# Patient Record
Sex: MALE | Race: WHITE | Employment: FULL TIME | URBAN - METROPOLITAN AREA
[De-identification: names, ages, dates, MRNs, and addresses within clinical notes are randomized per-mention and may not be internally consistent; named-entity substitution may affect disease eponyms.]

---

## 2017-01-12 ENCOUNTER — APPOINTMENT (OUTPATIENT)
Dept: INFUSION THERAPY | Age: 57
End: 2017-01-12

## 2017-01-12 RX ORDER — HYDROCHLOROTHIAZIDE 25 MG/1
25 TABLET ORAL DAILY
Qty: 90 TAB | Refills: 0 | Status: CANCELLED | OUTPATIENT
Start: 2017-01-12

## 2017-02-10 RX ORDER — DIPHENHYDRAMINE HCL 25 MG
CAPSULE ORAL
Qty: 2 CAP | Refills: 0 | Status: SHIPPED | OUTPATIENT
Start: 2017-02-10 | End: 2017-06-07 | Stop reason: ALTCHOICE

## 2017-02-10 RX ORDER — PREDNISONE 10 MG/1
TABLET ORAL
Qty: 3 TAB | Refills: 0 | Status: SHIPPED | OUTPATIENT
Start: 2017-02-10 | End: 2017-06-07 | Stop reason: ALTCHOICE

## 2017-02-15 DIAGNOSIS — I10 UNCONTROLLED HYPERTENSION: ICD-10-CM

## 2017-02-15 RX ORDER — LISINOPRIL AND HYDROCHLOROTHIAZIDE 12.5; 2 MG/1; MG/1
1 TABLET ORAL DAILY
Qty: 30 TAB | Refills: 2 | Status: SHIPPED | OUTPATIENT
Start: 2017-02-15 | End: 2017-05-27 | Stop reason: SDUPTHER

## 2017-02-21 ENCOUNTER — HOSPITAL ENCOUNTER (OUTPATIENT)
Dept: CT IMAGING | Age: 57
Discharge: HOME | End: 2017-02-21
Payer: COMMERCIAL

## 2017-02-21 DIAGNOSIS — C82.80: ICD-10-CM

## 2017-02-21 PROCEDURE — 74011000258 HC RX REV CODE- 258

## 2017-02-21 PROCEDURE — 74177 CT ABD & PELVIS W/CONTRAST: CPT

## 2017-02-21 PROCEDURE — 74011636320 HC RX REV CODE- 636/320

## 2017-02-21 PROCEDURE — 71260 CT THORAX DX C+: CPT

## 2017-02-21 RX ORDER — SODIUM CHLORIDE 0.9 % (FLUSH) 0.9 %
10 SYRINGE (ML) INJECTION
Status: COMPLETED | OUTPATIENT
Start: 2017-02-21 | End: 2017-02-21

## 2017-02-21 RX ADMIN — IOPAMIDOL 100 ML: 755 INJECTION, SOLUTION INTRAVENOUS at 10:00

## 2017-02-21 RX ADMIN — SODIUM CHLORIDE 100 ML: 900 INJECTION, SOLUTION INTRAVENOUS at 10:42

## 2017-02-21 RX ADMIN — Medication 10 ML: at 10:42

## 2017-02-24 ENCOUNTER — OFFICE VISIT (OUTPATIENT)
Dept: ONCOLOGY | Age: 57
End: 2017-02-24

## 2017-02-24 VITALS
WEIGHT: 230.8 LBS | BODY MASS INDEX: 29.62 KG/M2 | HEART RATE: 96 BPM | RESPIRATION RATE: 16 BRPM | OXYGEN SATURATION: 97 % | DIASTOLIC BLOOD PRESSURE: 93 MMHG | HEIGHT: 74 IN | TEMPERATURE: 97 F | SYSTOLIC BLOOD PRESSURE: 139 MMHG

## 2017-02-24 DIAGNOSIS — C82.80: Primary | ICD-10-CM

## 2017-02-24 DIAGNOSIS — C82.80: ICD-10-CM

## 2017-02-24 NOTE — PROGRESS NOTES
Chief Complaint   Patient presents with    Lymphoma    Other     CT Scan results     1. Have you been to the ER, urgent care clinic since your last visit? Hospitalized since your last visit? no    2. Have you seen or consulted any other health care providers outside of the [address] [place] [address] since your last visit? Include any pap smears or colon screening.  no

## 2017-02-24 NOTE — PROGRESS NOTES
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 Diagnosed as Stage III back in 1999. TREATMENT  R-CHOP x 6 cycles in 1999. CR1  Recurrence in 2008 treated with Rituxan. From January 2009 through February 2011 in [address] [place] [address] Drive  November 2012 evidence of recurrence in the spine on CT, MRI, and PET. Rituxan/Bendamustine started in April 2013 for 4 cycles. High dose methotrexate beginning in 7/15/2013 x 2 cycles, completed in August 2013. Autologous transplant on 10/22/13 with [facility]  Maintenance Rituxan started in July 2014 stopped July 2016     HISTORY OF PRESENT ILLNESS: Mr. Tu Garnett is a 49 y.o. male who presents for follow-up of lymphoma as above. He has had multiple chemotherapy regimens including an autologous stem cell transplant in October of 2013. He started his care with Dr. Debora Loera and has been following with Dr. Margarita Lugo since around 2008. Follows with Dr. Evelyn Mckeon at [facility] as well as Dr. Girish Angelo at [facility]. Presents today for follow-up. Overall, has been doing well since I saw him last.      He was seen by Dr. Girish Angelo at [facility] after his last Rituxan infusion in summer 2016. He was noted to have neutropenia. This improved with 1 shot of neupogen. Counts have been stable since. Otherwise, no new major medical problems. No fevers, chills or weight loss. No new lumps or bumps. No CP/SOB/TORRES. No nausea, vomiting, diarrhea, or constipation. No bleeding. Otherwise, complete ROS is per the symptom report form which has been scanned into the media section of the electronic medical record.       Past Medical History:   Diagnosis Date    Cancer [facility])     NHL    Chemotherapy follow-up examination     NIKO 9-25-15= Exercise stress echo is normal. Walked 9 minutes, normal EF    GERD (gastroesophageal reflux disease)     Gout 10/24/2011    Hypertension     Lymphoma, small cleaved-cell, follicular ([facility] [address]     recurrent,currently on remission       Past Surgical History:   Procedure Laterality Date    HX HEENT      lymph node resection-midline,neck    HX TRANSPLANT  10/2013    bone marrow transplant       Allergies   Allergen Reactions    Lactose Nausea and Vomiting    Iodinated Contrast Media - Oral And Iv Dye Hives       Current Outpatient Prescriptions   Medication Sig Dispense Refill    lisinopril-hydroCHLOROthiazide (PRINZIDE, ZESTORETIC) 20-12.5 mg per tablet Take 1 Tab by mouth daily for 30 days. 30 Tab 2    acetaminophen (TYLENOL) 325 mg tablet Take 650 mg by mouth every four (4) hours as needed for Pain (yesterday). Takes occasionally for joint pain      indomethacin (INDOCIN) 50 mg capsule Take 1 Cap by mouth two (2) times a day. 20 Cap 0    aspirin delayed-release 81 mg tablet Take  by mouth daily.  predniSONE (DELTASONE) 10 mg tablet Take 5 tabs (50mg) by mouth 13 hours, 7 hours, then 1 hour prior to CT 3 Tab 0    diphenhydrAMINE (BENADRYL) 25 mg capsule Take 2 tabs by mouth 1 hour prior to CT 2 Cap 0       Social History     Social History    Marital status:      Spouse name: N/A    Number of children: N/A    Years of education: N/A     Social History Main Topics    Smoking status: Former Smoker     Packs/day: 1.00     Years: 7.00     Quit date: 9/4/1999    Smokeless tobacco: Never Used    Alcohol use 3.5 oz/week     7 Standard drinks or equivalent per week    Drug use: No    Sexual activity: Yes     Partners: Female     Other Topics Concern    None     Social History Narrative    Works in [employer] at [employer]. Family History   Problem Relation Age of Onset    Lung Disease Mother      COPD    Heart Disease Father      polymyalgia rheumatica    Hypertension Brother     Cancer Brother      testicular cancer       ROS  As per the HPI, otherwise a comprehensive ROS is negative.   ECOG PS is 0.  Emotional well being addressed and patient is coping well. Physical Examination:   Visit Vitals    BP (!) 139/93 (BP 1 Location: Right arm, BP Patient Position: Sitting)    Pulse 96    Temp 97 °F (36.1 °C)    Resp 16    Ht 6' 2\" (1.88 m)    Wt 230 lb 12.8 oz (104.7 kg)    SpO2 97%    BMI 29.63 kg/m2     General appearance - alert, well appearing, and in no distress  Mental status - oriented to person, place, and time  Mouth - mucous membranes moist, pharynx normal without lesions  Neck - supple, no significant adenopathy  Lymphatics - no palpable lymphadenopathy, no hepatosplenomegaly  Chest - clear to auscultation, no wheezes, rales or rhonchi, symmetric air entry  Heart - normal rate, regular rhythm, normal S1, S2, no murmurs, rubs, clicks or gallops  Abdomen - soft, nontender, nondistended, no masses or organomegaly, bowel sounds present  Back exam - full range of motion, no tenderness, palpable spasm or pain on motion  Neurological - normal speech, no focal findings or movement disorder noted  Musculoskeletal - no joint tenderness, deformity or swelling  Extremities - peripheral pulses normal, no pedal edema, no clubbing or cyanosis  Skin - normal coloration and turgor, no rashes, no suspicious skin lesions noted    LABS  Lab Results   Component Value Date/Time    WBC 4.5 12/07/2016 08:22 AM    HGB 14.2 12/07/2016 08:22 AM    HCT 41.7 12/07/2016 08:22 AM    PLATELET 941 78/94/2068 08:22 AM    MCV 98 12/07/2016 08:22 AM    ABS.  NEUTROPHILS 1.7 09/22/2016 02:19 PM     Lab Results   Component Value Date/Time    Sodium 140 12/07/2016 08:22 AM    Potassium 3.6 12/07/2016 08:22 AM    Chloride 97 12/07/2016 08:22 AM    CO2 27 12/07/2016 08:22 AM    Glucose 105 12/07/2016 08:22 AM    BUN 19 12/07/2016 08:22 AM    Creatinine 1.27 12/07/2016 08:22 AM    GFR est AA 74 12/07/2016 08:22 AM    GFR est non-AA 64 12/07/2016 08:22 AM    Calcium 9.5 12/07/2016 08:22 AM     Lab Results   Component Value Date/Time AST (SGOT) 22 12/07/2016 08:22 AM    Alk. phosphatase 81 12/07/2016 08:22 AM    Protein, total 6.3 12/07/2016 08:22 AM    Albumin 4.4 12/07/2016 08:22 AM    Globulin 2.7 09/22/2016 02:19 PM    A-G Ratio 2.3 12/07/2016 08:22 AM       PATHOLOGY  I don't have his original pathology report. IMAGING  CT scan of his chest abdomen and pelvis on 8/19/2016 with an 11 mm soft tissue nodule just anterior to the left adrenal gland. It was previously 8 mm in size. Otherwise, the examination was unremarkable. CT chest/abdomen/pelvis on 2/22/16 with stable disease. CT chest/abdomen/pelvis on 8/18/15 with no lymphadenopathy in the chest, abdomen, or pelvis. Severe coronary calcifications. Previously described groundglass opacities in the superior segment left lower lobe most likely related to respiratory bronchiolitis. These have resolved. CT neck,chest, abdomen, pelvis on 3/25/15 with no lymphadenopathy in the neck, chest, abdomen, or pelvis. Subtle groundglass nodularity superior segment left lower lobe may be infectious/inflammatory. DEXA scan on 10/29/14 with normal bone density. CT neck, chest, abdomen, pelvis on 10/15/14 with no evidence of active disease. ASSESSMENT  Mr. Chris Temple is a 49 y.o. male who presents for follow-up of follicular lymphoma. DISCUSSION/PLAN  1. Follicular lymphoma. He completed Rituxan maintenance in July 2016. His CT scans from 2/21/17 were reviewed in detail. There is a small soft tissue nodule versus lymph node near the left adrenal gland this has not changed. It is tough to know the significance of this. For now, let us continue to check CT scans every 6 months. He does well with premedication for contrast allergy. 2.  Neutropenia. He did have some neutropenia which was presumably due to Rituxan. This has resolved. He had blood work at [facility] 2 days ago. I've requested these results. Plan to see him back in 6 months, but sooner if needed.     Joss Fermin, MD Mr. Nunezon Harley has a reminder for a \"due or due soon\" health maintenance. I have asked that he contact his primary care provider for follow-up on this health maintenance.

## 2017-03-25 DIAGNOSIS — M10.9 GOUT, UNSPECIFIED CAUSE, UNSPECIFIED CHRONICITY, UNSPECIFIED SITE: ICD-10-CM

## 2017-03-27 RX ORDER — INDOMETHACIN 50 MG/1
50 CAPSULE ORAL 2 TIMES DAILY
Qty: 20 CAP | Refills: 0 | Status: SHIPPED | OUTPATIENT
Start: 2017-03-27 | End: 2017-05-17 | Stop reason: SDUPTHER

## 2017-03-27 NOTE — TELEPHONE ENCOUNTER
From: Sujata Rossi  To: Bing Damian MD  Sent: 3/25/2017 4:59 PM EDT  Subject: Medication Renewal Request    Original authorizing provider: MD Sujata Marie would like a refill of the following medications:  indomethacin (INDOCIN) 50 mg capsule Bing Damian MD]    Preferred pharmacy: [address] AT [address]    Comment:  Please refill at [place] on [address], as we no longer have [facility]. Thank you!

## 2017-05-17 DIAGNOSIS — M10.9 GOUT, UNSPECIFIED CAUSE, UNSPECIFIED CHRONICITY, UNSPECIFIED SITE: ICD-10-CM

## 2017-05-17 NOTE — TELEPHONE ENCOUNTER
From: Bre Herrera  To: Laureen Sanchez MD  Sent: 5/17/2017 2:55 PM EDT  Subject: Medication Renewal Request    Original authorizing provider: MD Bre Cochran would like a refill of the following medications:  indomethacin (INDOCIN) 50 mg capsule Laureen Sanchez MD]    Preferred pharmacy: [address] AT [address]    Comment:  Please refill at [facility] on [address]

## 2017-05-19 RX ORDER — INDOMETHACIN 50 MG/1
50 CAPSULE ORAL 2 TIMES DAILY
Qty: 20 CAP | Refills: 0 | Status: SHIPPED | OUTPATIENT
Start: 2017-05-19 | End: 2017-06-07 | Stop reason: SDUPTHER

## 2017-05-27 DIAGNOSIS — I10 UNCONTROLLED HYPERTENSION: ICD-10-CM

## 2017-05-27 RX ORDER — LISINOPRIL AND HYDROCHLOROTHIAZIDE 12.5; 2 MG/1; MG/1
TABLET ORAL
Qty: 30 TAB | Refills: 0 | Status: SHIPPED | OUTPATIENT
Start: 2017-05-27 | End: 2017-06-07 | Stop reason: SDUPTHER

## 2017-06-07 ENCOUNTER — OFFICE VISIT (OUTPATIENT)
Dept: INTERNAL MEDICINE CLINIC | Age: 57
End: 2017-06-07

## 2017-06-07 VITALS
DIASTOLIC BLOOD PRESSURE: 84 MMHG | OXYGEN SATURATION: 98 % | RESPIRATION RATE: 17 BRPM | TEMPERATURE: 97.8 F | BODY MASS INDEX: 28.85 KG/M2 | SYSTOLIC BLOOD PRESSURE: 120 MMHG | HEART RATE: 100 BPM | WEIGHT: 224.8 LBS | HEIGHT: 74 IN

## 2017-06-07 DIAGNOSIS — E53.8 B12 DEFICIENCY: ICD-10-CM

## 2017-06-07 DIAGNOSIS — C82.80: ICD-10-CM

## 2017-06-07 DIAGNOSIS — Z00.00 PHYSICAL EXAM: Primary | ICD-10-CM

## 2017-06-07 DIAGNOSIS — R68.82 DECREASED LIBIDO: ICD-10-CM

## 2017-06-07 DIAGNOSIS — I10 UNCONTROLLED HYPERTENSION: ICD-10-CM

## 2017-06-07 DIAGNOSIS — M10.9 GOUT, UNSPECIFIED CAUSE, UNSPECIFIED CHRONICITY, UNSPECIFIED SITE: ICD-10-CM

## 2017-06-07 RX ORDER — LISINOPRIL AND HYDROCHLOROTHIAZIDE 12.5; 2 MG/1; MG/1
1 TABLET ORAL DAILY
Qty: 90 TAB | Refills: 0 | Status: SHIPPED | OUTPATIENT
Start: 2017-06-07 | End: 2017-08-08 | Stop reason: SDUPTHER

## 2017-06-07 RX ORDER — INDOMETHACIN 50 MG/1
50 CAPSULE ORAL 2 TIMES DAILY
Qty: 180 CAP | Refills: 0 | Status: SHIPPED | OUTPATIENT
Start: 2017-06-07 | End: 2017-09-05

## 2017-06-07 NOTE — PROGRESS NOTES
Written by Narciso Mcgrath, as dictated by Dr. Willis Singer MD.    Eugenio Mittal is a 56 y.o. male. HPI  The patient comes in today for a complete physical examination. He recently got poison ivy, which he has been scratching and is treating with OTC cream. He experienced erectile dysfunction this year. He stopped taking B12 because he was concerned it was affecting his libido. He has lost 6 lbs since his last visit. He started getting vaccinations 2 weeks ago from [facility]  and will return for further immunizations. He follows with Dr. Zenaida Dennis (onc) and Dr. Viki Flanagan (onc) due to his Hx of lymphoma. He has been snoring at night, but has not gone in for a sleep study. He has allergies and been taking flonase for it. He declines chest tightness, palpitations, constipation and diarrhea. He drinks a cocktail every day and does not smoke. He walks daily. Patient Active Problem List   Diagnosis Code    HTN (hypertension) I10    Gout M10.9    Lymphoma, follicular, small and large cleaved-cell ([facility] [address] C82.80    Chemotherapy follow-up examination Z09        Current Outpatient Prescriptions on File Prior to Visit   Medication Sig Dispense Refill    aspirin delayed-release 81 mg tablet Take  by mouth daily.  diphenhydrAMINE (BENADRYL) 25 mg capsule Take 2 tabs by mouth 1 hour prior to CT 2 Cap 0    acetaminophen (TYLENOL) 325 mg tablet Take 650 mg by mouth every four (4) hours as needed for Pain (yesterday). Takes occasionally for joint pain       No current facility-administered medications on file prior to visit.         Allergies   Allergen Reactions    Lactose Nausea and Vomiting    Iodinated Contrast Media - Oral And Iv Dye Hives       Past Medical History:   Diagnosis Date    [facility]     NHL    Chemotherapy follow-up examination     NIKO 9-25-15= Exercise stress echo is normal. Walked 9 minutes, normal EF    GERD (gastroesophageal reflux disease)     Gout 10/24/2011    Hypertension     Lymphoma, small cleaved-cell, follicular (HCC)     recurrent,currently on remission       Past Surgical History:   Procedure Laterality Date    HX HEENT      lymph node resection-midline,neck    HX TRANSPLANT  10/2013    bone marrow transplant       Family History   Problem Relation Age of Onset    Lung Disease Mother      COPD    Heart Disease Father      polymyalgia rheumatica    Hypertension Brother     Cancer Brother      testicular cancer       Social History     Social History    Marital status:      Spouse name: N/A    Number of children: N/A    Years of education: N/A     Occupational History    Not on file. Social History Main Topics    Smoking status: Former Smoker     Packs/day: 1.00     Years: 7.00     Quit date: 9/4/1999    Smokeless tobacco: Never Used    Alcohol use 3.5 oz/week     7 Standard drinks or equivalent per week    Drug use: No    Sexual activity: Yes     Partners: Female     Other Topics Concern    Not on file     Social History Narrative    Works in [employer] at [employer]. No visits with results within 3 Month(s) from this visit.   Latest known visit with results is:    Office Visit on 12/06/2016   Component Date Value Ref Range Status    Glucose 12/07/2016 105* 65 - 99 mg/dL Final    BUN 12/07/2016 19  6 - 24 mg/dL Final    Creatinine 12/07/2016 1.27  0.76 - 1.27 mg/dL Final    GFR est non-AA 12/07/2016 64  >59 mL/min/1.73 Final    GFR est AA 12/07/2016 74  >59 mL/min/1.73 Final    BUN/Creatinine ratio 12/07/2016 15  9 - 20 Final    Sodium 12/07/2016 140  136 - 144 mmol/L Final    Comment: **Effective December 12, 2016 the reference interval**    for Sodium, Serum will be changing to:                                              134 - 144      Potassium 12/07/2016 3.6  3.5 - 5.2 mmol/L Final    Chloride 12/07/2016 97  97 - 106 mmol/L Final    Comment: **Effective December 12, 2016 the reference interval**    for Chloride, Serum will be changing to:                                               96 - 106      CO2 12/07/2016 27  18 - 29 mmol/L Final    Calcium 12/07/2016 9.5  8.7 - 10.2 mg/dL Final    Protein, total 12/07/2016 6.3  6.0 - 8.5 g/dL Final    Albumin 12/07/2016 4.4  3.5 - 5.5 g/dL Final    GLOBULIN, TOTAL 12/07/2016 1.9  1.5 - 4.5 g/dL Final    A-G Ratio 12/07/2016 2.3  1.1 - 2.5 Final    Bilirubin, total 12/07/2016 0.5  0.0 - 1.2 mg/dL Final    Alk. phosphatase 12/07/2016 81  39 - 117 IU/L Final    AST (SGOT) 12/07/2016 22  0 - 40 IU/L Final    ALT (SGPT) 12/07/2016 19  0 - 44 IU/L Final    WBC 12/07/2016 4.5  3.4 - 10.8 x10E3/uL Final    RBC 12/07/2016 4.24  4.14 - 5.80 x10E6/uL Final    HGB 12/07/2016 14.2  12.6 - 17.7 g/dL Final    HCT 12/07/2016 41.7  37.5 - 51.0 % Final    MCV 12/07/2016 98* 79 - 97 fL Final    MCH 12/07/2016 33.5* 26.6 - 33.0 pg Final    MCHC 12/07/2016 34.1  31.5 - 35.7 g/dL Final    RDW 12/07/2016 13.4  12.3 - 15.4 % Final    PLATELET 22/85/4344 480  150 - 379 x10E3/uL Final    Cholesterol, total 12/07/2016 194  100 - 199 mg/dL Final    Triglyceride 12/07/2016 140  0 - 149 mg/dL Final    HDL Cholesterol 12/07/2016 56  >39 mg/dL Final    VLDL, calculated 12/07/2016 28  5 - 40 mg/dL Final    LDL, calculated 12/07/2016 110* 0 - 99 mg/dL Final    TSH 12/07/2016 1.850  0.450 - 4.500 uIU/mL Final    VITAMIN D, 25-HYDROXY 12/07/2016 30.4  30.0 - 100.0 ng/mL Final    Comment: Vitamin D deficiency has been defined by the [place] of  Medicine and an Endocrine Society practice guideline as a  level of serum 25-OH vitamin D less than 20 ng/mL (1,2). The Endocrine Society went on to further define vitamin D  insufficiency as a level between 21 and 29 ng/mL (2). 1. IOM ([place] of Medicine). 2010. Dietary reference     intakes for calcium and D. [facility]: The     [employer].   2. Odilon MARTINEZ, Maciej MACIEL, Mey BORJAS, et al. Evaluation, treatment, and prevention of vitamin D     deficiency: an Endocrine Society clinical practice     guideline. JCEM. 2011 Jul; 96(6):1911-30.  INTERPRETATION 12/07/2016 Note   Final    Supplement report is available.  Vitamin B12 12/07/2016 221  211 - 946 pg/mL Final    Folate 12/07/2016 8.7  >3.0 ng/mL Final    Comment: A serum folate concentration of less than 3.1 ng/mL is  considered to represent clinical deficiency.  Hemoglobin A1c 12/07/2016 5.4  4.8 - 5.6 % Final    Comment:          Pre-diabetes: 5.7 - 6.4           Diabetes: >6.4           Glycemic control for adults with diabetes: <7.0      SPECIMEN STATUS REPORT 12/07/2016 COMMENT   Final    Comment: Written Authorization  Written Authorization  Written Authorization Received. Authorization received from Yakelin Powell 12-  Logged by Abigail Ying         Review of Systems   Constitutional: Negative for malaise/fatigue. HENT: Negative for congestion. Eyes: Negative for blurred vision and discharge. Respiratory: Negative for cough and wheezing. Cardiovascular: Negative for chest pain and palpitations. Gastrointestinal: Negative for abdominal pain and heartburn. Genitourinary: Negative for frequency and urgency. Musculoskeletal: Negative for joint pain and myalgias. Neurological: Negative for dizziness, tingling, sensory change, weakness and headaches. Psychiatric/Behavioral: Negative for depression and suicidal ideas. The patient is not nervous/anxious. Visit Vitals    /84 (BP 1 Location: Right arm, BP Patient Position: Sitting)    Pulse 100    Temp 97.8 °F (36.6 °C) (Oral)    Resp 17    Ht 6' 2\" (1.88 m)    Wt 224 lb 12.8 oz (102 kg)    SpO2 98%    BMI 28.86 kg/m2     Physical Exam   Constitutional: He is oriented to person, place, and time. He appears well-nourished. No distress.    HENT:   Right Ear: External ear normal.   Left Ear: External ear normal.   Mouth/Throat: Oropharynx is clear and moist.   Snoring   Eyes: Conjunctivae and EOM are normal. Right eye exhibits no discharge. Left eye exhibits no discharge. Neck: Normal range of motion. Neck supple. Cardiovascular: Normal rate and regular rhythm. WEak circulation in feet. Pulmonary/Chest: Effort normal and breath sounds normal. He has no wheezes. Abdominal: Soft. Bowel sounds are normal. He exhibits no distension. Lymphadenopathy:     He has no cervical adenopathy. Neurological: He is alert and oriented to person, place, and time. Psychiatric: He has a normal mood and affect. Nursing note and vitals reviewed. ASSESSMENT and PLAN    ICD-10-CM ICD-9-CM    1. Physical exam Z00.00 V70.9 Complete physical exam done. Basic labs drawn. 2. Uncontrolled hypertension I10 401.9 lisinopril-hydroCHLOROthiazide (PRINZIDE, ZESTORETIC) 20-12.5 mg per tablet   3. Gout, unspecified cause, unspecified chronicity, unspecified site M10.9 274.9 indomethacin (INDOCIN) 50 mg capsule   4. Lymphoma, follicular, small and large cleaved-cell (HCC) C82.80 202.00 [place] [facility] ( Dr. Charles Gomez)   5. Decreased libido R68.82 799.81 TESTOSTERONE, FREE & TOTAL   6. B12 deficiency E53.8 266.2 VITAMIN B12 & FOLATE    I did recommend B12 supplements today. I discussed that it is his decision to get a sleep study, but snoring can lead to hypoxia in the brain and it would be wise to have one done  This plan was reviewed with the patient and patient agrees. All questions were answered. This scribe documentation was reviewed by me and accurately reflects the examination and decisions made by me.

## 2017-06-08 LAB
COMMENT: NORMAL
FOLATE SERPL-MCNC: 7.7 NG/ML
TESTOST FREE SERPL-MCNC: 9.5 PG/ML (ref 7.2–24)
TESTOST SERPL-MCNC: 420 NG/DL (ref 348–1197)
VIT B12 SERPL-MCNC: 437 PG/ML (ref 211–946)

## 2017-07-01 DIAGNOSIS — I10 UNCONTROLLED HYPERTENSION: ICD-10-CM

## 2017-07-03 RX ORDER — LISINOPRIL AND HYDROCHLOROTHIAZIDE 12.5; 2 MG/1; MG/1
TABLET ORAL
Qty: 30 TAB | Refills: 0 | Status: SHIPPED | OUTPATIENT
Start: 2017-07-03 | End: 2017-08-08 | Stop reason: SDUPTHER

## 2017-07-30 DIAGNOSIS — I10 UNCONTROLLED HYPERTENSION: ICD-10-CM

## 2017-07-31 RX ORDER — LISINOPRIL AND HYDROCHLOROTHIAZIDE 12.5; 2 MG/1; MG/1
TABLET ORAL
Qty: 30 TAB | Refills: 0 | Status: SHIPPED | OUTPATIENT
Start: 2017-07-31 | End: 2018-10-29 | Stop reason: SDUPTHER

## 2017-08-01 ENCOUNTER — TELEPHONE (OUTPATIENT)
Dept: ONCOLOGY | Age: 57
End: 2017-08-01

## 2017-08-01 RX ORDER — PREDNISONE 50 MG/1
TABLET ORAL
Qty: 3 TAB | Refills: 0 | Status: SHIPPED | OUTPATIENT
Start: 2017-08-01 | End: 2017-08-08

## 2017-08-01 NOTE — TELEPHONE ENCOUNTER
Pt called stating he was referred to Dr. Jean Reyna, he is a former Dr. Yadira Suggs pt, and that he will be having scans this week and would like to know if Dr. Jean Reyna can call in his premedications.  Call back number [phone]

## 2017-08-01 NOTE — TELEPHONE ENCOUNTER
Called and advised patient that per DANIEL Stapleton NP, prescription for Prednisone had been sent to pharmacy and instructed patient that in addition to the Prednisone, he needs to take Benadryl 50 mg by mouth one hour prior to procedure. Patient voices understanding and denies any further questions at this time.

## 2017-08-04 ENCOUNTER — HOSPITAL ENCOUNTER (OUTPATIENT)
Dept: CT IMAGING | Age: 57
Discharge: HOME | End: 2017-08-04
Payer: COMMERCIAL

## 2017-08-04 VITALS — RESPIRATION RATE: 20 BRPM | HEART RATE: 80 BPM | DIASTOLIC BLOOD PRESSURE: 80 MMHG | SYSTOLIC BLOOD PRESSURE: 140 MMHG

## 2017-08-04 DIAGNOSIS — C82.80: ICD-10-CM

## 2017-08-04 PROCEDURE — 74177 CT ABD & PELVIS W/CONTRAST: CPT

## 2017-08-04 PROCEDURE — 71260 CT THORAX DX C+: CPT

## 2017-08-04 PROCEDURE — 74011000258 HC RX REV CODE- 258

## 2017-08-04 PROCEDURE — 74011636320 HC RX REV CODE- 636/320

## 2017-08-04 RX ORDER — SODIUM CHLORIDE 0.9 % (FLUSH) 0.9 %
10 SYRINGE (ML) INJECTION
Status: COMPLETED | OUTPATIENT
Start: 2017-08-04 | End: 2017-08-04

## 2017-08-04 RX ADMIN — IOPAMIDOL 100 ML: 755 INJECTION, SOLUTION INTRAVENOUS at 09:15

## 2017-08-04 RX ADMIN — Medication 10 ML: at 09:15

## 2017-08-04 RX ADMIN — SODIUM CHLORIDE 100 ML: 900 INJECTION, SOLUTION INTRAVENOUS at 09:15

## 2017-08-04 NOTE — PROGRESS NOTES
Called to ER CT for description of pt. sneezing post CT scan with contrast. Pt. Has already premedicated with Benadryl and Prednisone with the standard protocol of premedication. Vital signs stable. Po fluids encouraged and provided. Pt. Discharged ambulatory and no further symptoms, no hives, no shortness of breath.

## 2017-08-08 ENCOUNTER — OFFICE VISIT (OUTPATIENT)
Dept: ONCOLOGY | Age: 57
End: 2017-08-08

## 2017-08-08 VITALS
HEIGHT: 74 IN | HEART RATE: 112 BPM | DIASTOLIC BLOOD PRESSURE: 95 MMHG | TEMPERATURE: 97.7 F | WEIGHT: 228.6 LBS | RESPIRATION RATE: 20 BRPM | OXYGEN SATURATION: 97 % | SYSTOLIC BLOOD PRESSURE: 139 MMHG | BODY MASS INDEX: 29.34 KG/M2

## 2017-08-08 DIAGNOSIS — C82.80: Primary | ICD-10-CM

## 2017-08-08 NOTE — PROGRESS NOTES
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 Diagnosed as Stage III back in 1999. TREATMENT  R-CHOP x 6 cycles in 1999. CR1  Recurrence in 2008 treated with Rituxan. From January 2009 through February 2011 in [address] [place] [address] Drive  November 2012 evidence of recurrence in the spine on CT, MRI, and PET. Rituxan/Bendamustine started in April 2013 for 4 cycles. High dose methotrexate beginning in 7/15/2013 x 2 cycles, completed in August 2013. Autologous transplant on 10/22/13 with [facility]  Maintenance Rituxan started in July 2014 stopped July 2016     HISTORY OF PRESENT ILLNESS: Mr. Stephania Sage is a 56 y.o. male who presents for follow-up of lymphoma as above. He has had multiple chemotherapy regimens including an autologous stem cell transplant in October of 2013. He started his care with Dr. Jai Faye and has been following with Dr. Rosy Chan since around 2008. Follows with Dr. Eliecer Perez at [facility] as well as Dr. Renea Jett at [facility]. Presents today for follow-up. Overall, has been doing well    Otherwise, no new major medical problems. No fevers, chills or weight loss. No new lumps or bumps. No CP/SOB/TORRES. No nausea, vomiting, diarrhea, or constipation. No bleeding.         Past Medical History:   Diagnosis Date    Cancer [facility])     NHL    Chemotherapy follow-up examination     NIKO 9-25-15= Exercise stress echo is normal. Walked 9 minutes, normal EF    GERD (gastroesophageal reflux disease)     Gout 10/24/2011    Hypertension     Lymphoma, small cleaved-cell, follicular (HCC)     recurrent,currently on remission       Past Surgical History:   Procedure Laterality Date    HX HEENT      lymph node resection-midline,neck    HX TRANSPLANT 10/2013    bone marrow transplant       Allergies   Allergen Reactions    Lactose Nausea and Vomiting    Iodinated Contrast- Oral And Iv Dye Hives       Current Outpatient Prescriptions   Medication Sig Dispense Refill    predniSONE (DELTASONE) 50 mg tablet Take 1 tablet at 13, 7 and 1 hours prior to procedure. Also take 50 mg benadryl 1 hour prior to procedure. 3 Tab 0    lisinopril-hydroCHLOROthiazide (PRINZIDE, ZESTORETIC) 20-12.5 mg per tablet TAKE 1 TABLET BY MOUTH DAILY 30 Tab 0    lisinopril-hydroCHLOROthiazide (PRINZIDE, ZESTORETIC) 20-12.5 mg per tablet TAKE 1 TABLET BY MOUTH DAILY 30 Tab 0    lisinopril-hydroCHLOROthiazide (PRINZIDE, ZESTORETIC) 20-12.5 mg per tablet Take 1 Tab by mouth daily for 90 days. 90 Tab 0    indomethacin (INDOCIN) 50 mg capsule Take 1 Cap by mouth two (2) times a day for 90 days. 180 Cap 0    aspirin delayed-release 81 mg tablet Take  by mouth daily. Social History     Social History    Marital status:      Spouse name: N/A    Number of children: N/A    Years of education: N/A     Social History Main Topics    Smoking status: Former Smoker     Packs/day: 1.00     Years: 7.00     Quit date: 9/4/1999    Smokeless tobacco: Never Used    Alcohol use 3.5 oz/week     7 Standard drinks or equivalent per week    Drug use: No    Sexual activity: Yes     Partners: Female     Other Topics Concern    None     Social History Narrative    Works in [employer] at [employer]. Family History   Problem Relation Age of Onset    Lung Disease Mother      COPD    Heart Disease Father      polymyalgia rheumatica    Hypertension Brother     Cancer Brother      testicular cancer       ROS  As per the HPI, otherwise a comprehensive ROS is negative. ECOG PS is 0. Emotional well being addressed and patient is coping well.     Physical Examination:   Visit Vitals    Ht 6' 2\" (1.88 m)    Wt 228 lb 9.6 oz (103.7 kg)    BMI 29.35 kg/m2     General appearance - alert, well appearing, and in no distress  Mental status - oriented to person, place, and time  Mouth - mucous membranes moist, pharynx normal without lesions  Neck - supple, no significant adenopathy  Lymphatics - no palpable lymphadenopathy, no hepatosplenomegaly  Chest - clear to auscultation, no wheezes, rales or rhonchi, symmetric air entry  Heart - normal rate, regular rhythm, normal S1, S2, no murmurs, rubs, clicks or gallops  Abdomen - soft, nontender, nondistended, no masses or organomegaly, bowel sounds present  Back exam - full range of motion, no tenderness, palpable spasm or pain on motion  Neurological - normal speech, no focal findings or movement disorder noted  Musculoskeletal - no joint tenderness, deformity or swelling  Extremities - peripheral pulses normal, no pedal edema, no clubbing or cyanosis  Skin - normal coloration and turgor, no rashes, no suspicious skin lesions noted    LABS  Lab Results   Component Value Date/Time    WBC 4.5 12/07/2016 08:22 AM    HGB 14.2 12/07/2016 08:22 AM    HCT 41.7 12/07/2016 08:22 AM    PLATELET 130 34/36/2117 08:22 AM    MCV 98 12/07/2016 08:22 AM    ABS. NEUTROPHILS 1.7 09/22/2016 02:19 PM     Lab Results   Component Value Date/Time    Sodium 140 12/07/2016 08:22 AM    Potassium 3.6 12/07/2016 08:22 AM    Chloride 97 12/07/2016 08:22 AM    CO2 27 12/07/2016 08:22 AM    Glucose 105 12/07/2016 08:22 AM    BUN 19 12/07/2016 08:22 AM    Creatinine 1.27 12/07/2016 08:22 AM    GFR est AA 74 12/07/2016 08:22 AM    GFR est non-AA 64 12/07/2016 08:22 AM    Calcium 9.5 12/07/2016 08:22 AM     Lab Results   Component Value Date/Time    AST (SGOT) 22 12/07/2016 08:22 AM    Alk. phosphatase 81 12/07/2016 08:22 AM    Protein, total 6.3 12/07/2016 08:22 AM    Albumin 4.4 12/07/2016 08:22 AM    Globulin 2.7 09/22/2016 02:19 PM    A-G Ratio 2.3 12/07/2016 08:22 AM       PATHOLOGY  I don't have his original pathology report.      IMAGING  CT scan of his chest abdomen and pelvis on 8/19/2016 with an 11 mm soft tissue nodule just anterior to the left adrenal gland. It was previously 8 mm in size. Otherwise, the examination was unremarkable. CT chest/abdomen/pelvis on 2/22/16 with stable disease. CT chest/abdomen/pelvis on 8/18/15 with no lymphadenopathy in the chest, abdomen, or pelvis. Severe coronary calcifications. Previously described groundglass opacities in the superior segment left lower lobe most likely related to respiratory bronchiolitis. These have resolved. CT neck,chest, abdomen, pelvis on 3/25/15 with no lymphadenopathy in the neck, chest, abdomen, or pelvis. Subtle groundglass nodularity superior segment left lower lobe may be infectious/inflammatory. DEXA scan on 10/29/14 with normal bone density. CT neck, chest, abdomen, pelvis on 10/15/14 with no evidence of active disease. 8/4/17 CT c/a/p  FINDINGS:     Lungs: Lungs are clear bilaterally.     Lymph nodes: There is no mediastinal, hilar or axillary lymphadenopathy.     Pleura: The pleural spaces are normal.     Heart: The heart is normal in size and there is no pericardial fluid.     Bones: No lytic or sclerotic osseous lesion is visualized.     Abdomen/pelvis:     Liver: The liver is normal.     Spleen: The spleen is normal.     Pancreas: The pancreas is normal.     Adrenals: The adrenals are normal.     Gallbladder: Gallbladder is normal.     Kidneys: The kidneys are normal.     Bowel: No dilated or thickened loop of large or small bowel is seen.     Lymph nodes: There is no new meli hepatis, mesenteric or retroperitoneal lymphadenopathy. Subcentimeter retroperitoneal and mesenteric lymph nodes are unchanged.     Enlarging soft tissue density nodule anterior to the left adrenal gland 3-66 16  x 18 mm in size previously 14 x 15 mm. This is minimally increased.  Minimal  retroperitoneal stranding on the left as well.     Appendix: The appendix is normal.     Urinary bladder: Urinary bladder is partially filled and grossly normal.     Bones: No lytic or sclerotic osseous lesion is visualized.     Miscellaneous: There is no free intraperitoneal gas or fluid. There is no focal  fluid collection to suggest abscess.     IMPRESSION  IMPRESSION:   Slight increased size of retroperitoneal soft tissue density anterior to the  left adrenal gland.     Otherwise there is no interval change. ASSESSMENT  Mr. Tiara Irby is a 56 y.o. male who presents for follow-up of follicular lymphoma. DISCUSSION/PLAN  1. Follicular lymphoma. He completed Rituxan maintenance in July 2016. His CT scans from 8/4/17 were reviewed in detail. There is a small soft tissue nodule versus lymph node near the left adrenal gland this has slightly increased in size. It is tough to know the significance of this. Discussed with Dr. Jae Casillas. Recommend PET vs. Monitoring in 3 months. We have decided on PET and this was ordered. He does well with premedication for contrast allergy. 2.  Neutropenia. Reviewed [facility] labs, LDH normal, wbc normal    Plan to see him back in 3 months, but sooner if needed, will depend on PET scan. > 40 minutes were spent with this patient with > 50% of that time spent in face to face counseling.       Carlos Her MD

## 2017-08-21 ENCOUNTER — HOSPITAL ENCOUNTER (OUTPATIENT)
Dept: PET IMAGING | Age: 57
Discharge: HOME | End: 2017-08-21
Attending: INTERNAL MEDICINE
Payer: COMMERCIAL

## 2017-08-21 VITALS — BODY MASS INDEX: 28.75 KG/M2 | HEIGHT: 74 IN | WEIGHT: 224 LBS

## 2017-08-21 DIAGNOSIS — C82.80: Primary | ICD-10-CM

## 2017-08-21 DIAGNOSIS — C82.80: ICD-10-CM

## 2017-08-21 PROCEDURE — A9552 F18 FDG: HCPCS

## 2017-08-21 RX ORDER — SODIUM CHLORIDE 0.9 % (FLUSH) 0.9 %
10 SYRINGE (ML) INJECTION
Status: COMPLETED | OUTPATIENT
Start: 2017-08-21 | End: 2017-08-21

## 2017-08-21 RX ADMIN — Medication 10 ML: at 08:10

## 2017-08-24 DIAGNOSIS — C82.80: ICD-10-CM

## 2017-08-30 ENCOUNTER — HOSPITAL ENCOUNTER (OUTPATIENT)
Dept: GENERAL RADIOLOGY | Age: 57
Discharge: HOME | End: 2017-08-30
Attending: RADIOLOGY
Payer: COMMERCIAL

## 2017-08-30 ENCOUNTER — HOSPITAL ENCOUNTER (OUTPATIENT)
Dept: CT IMAGING | Age: 57
Discharge: HOME | End: 2017-08-30
Attending: NURSE PRACTITIONER
Payer: COMMERCIAL

## 2017-08-30 VITALS
OXYGEN SATURATION: 99 % | BODY MASS INDEX: 28.62 KG/M2 | WEIGHT: 223 LBS | HEIGHT: 74 IN | RESPIRATION RATE: 18 BRPM | DIASTOLIC BLOOD PRESSURE: 69 MMHG | HEART RATE: 79 BPM | SYSTOLIC BLOOD PRESSURE: 145 MMHG

## 2017-08-30 DIAGNOSIS — C82.80: ICD-10-CM

## 2017-08-30 LAB
APTT PPP: 31.8 SEC (ref 22.1–32.5)
BASOPHILS # BLD: 0.1 K/UL (ref 0–0.1)
BASOPHILS NFR BLD: 1 % (ref 0–1)
EOSINOPHIL # BLD: 0.5 K/UL (ref 0–0.4)
EOSINOPHIL NFR BLD: 10 % (ref 0–7)
ERYTHROCYTE [DISTWIDTH] IN BLOOD BY AUTOMATED COUNT: 12.4 % (ref 11.5–14.5)
FIBRINOGEN PPP-MCNC: 316 MG/DL (ref 200–475)
HCT VFR BLD AUTO: 43.4 % (ref 36.6–50.3)
HGB BLD-MCNC: 14.9 G/DL (ref 12.1–17)
INR PPP: 1 (ref 0.9–1.1)
LYMPHOCYTES # BLD: 1.2 K/UL (ref 0.8–3.5)
LYMPHOCYTES NFR BLD: 27 % (ref 12–49)
MCH RBC QN AUTO: 33.3 PG (ref 26–34)
MCHC RBC AUTO-ENTMCNC: 34.3 G/DL (ref 30–36.5)
MCV RBC AUTO: 96.9 FL (ref 80–99)
MONOCYTES # BLD: 0.5 K/UL (ref 0–1)
MONOCYTES NFR BLD: 11 % (ref 5–13)
NEUTS SEG # BLD: 2.3 K/UL (ref 1.8–8)
NEUTS SEG NFR BLD: 51 % (ref 32–75)
PLATELET # BLD AUTO: 195 K/UL (ref 150–400)
PROTHROMBIN TIME: 10 SEC (ref 9–11.1)
RBC # BLD AUTO: 4.48 M/UL (ref 4.1–5.7)
THERAPEUTIC RANGE,PTTT: NORMAL SECS (ref 58–77)
WBC # BLD AUTO: 4.6 K/UL (ref 4.1–11.1)

## 2017-08-30 PROCEDURE — 71010 XR CHEST PORT: CPT

## 2017-08-30 PROCEDURE — 77030003503 HC NDL BIOP TISS BD -B

## 2017-08-30 PROCEDURE — 77030003666 HC NDL SPINAL BD -A

## 2017-08-30 PROCEDURE — 49180 BIOPSY ABDOMINAL MASS: CPT

## 2017-08-30 PROCEDURE — 85610 PROTHROMBIN TIME: CPT | Performed by: RADIOLOGY

## 2017-08-30 PROCEDURE — 99152 MOD SED SAME PHYS/QHP 5/>YRS: CPT

## 2017-08-30 PROCEDURE — 99153 MOD SED SAME PHYS/QHP EA: CPT

## 2017-08-30 PROCEDURE — 74011250636 HC RX REV CODE- 250/636: Performed by: RADIOLOGY

## 2017-08-30 PROCEDURE — 77030003504 HC NDL BIOP TISS COOK -A

## 2017-08-30 PROCEDURE — 36415 COLL VENOUS BLD VENIPUNCTURE: CPT | Performed by: RADIOLOGY

## 2017-08-30 PROCEDURE — 77012 CT SCAN FOR NEEDLE BIOPSY: CPT

## 2017-08-30 PROCEDURE — 85025 COMPLETE CBC W/AUTO DIFF WBC: CPT | Performed by: RADIOLOGY

## 2017-08-30 PROCEDURE — 88173 CYTOPATH EVAL FNA REPORT: CPT | Performed by: RADIOLOGY

## 2017-08-30 RX ORDER — FENTANYL CITRATE 50 UG/ML
100 INJECTION, SOLUTION INTRAMUSCULAR; INTRAVENOUS
Status: DISCONTINUED | OUTPATIENT
Start: 2017-08-30 | End: 2017-09-03 | Stop reason: HOSPADM

## 2017-08-30 RX ORDER — MIDAZOLAM HYDROCHLORIDE 1 MG/ML
5 INJECTION, SOLUTION INTRAMUSCULAR; INTRAVENOUS
Status: DISCONTINUED | OUTPATIENT
Start: 2017-08-30 | End: 2017-09-03 | Stop reason: HOSPADM

## 2017-08-30 RX ADMIN — MIDAZOLAM HYDROCHLORIDE 1 MG: 1 INJECTION, SOLUTION INTRAMUSCULAR; INTRAVENOUS at 10:19

## 2017-08-30 RX ADMIN — MIDAZOLAM HYDROCHLORIDE 1 MG: 1 INJECTION, SOLUTION INTRAMUSCULAR; INTRAVENOUS at 09:56

## 2017-08-30 RX ADMIN — FENTANYL CITRATE 25 MCG: 50 INJECTION, SOLUTION INTRAMUSCULAR; INTRAVENOUS at 10:40

## 2017-08-30 RX ADMIN — FENTANYL CITRATE 25 MCG: 50 INJECTION, SOLUTION INTRAMUSCULAR; INTRAVENOUS at 10:19

## 2017-08-30 RX ADMIN — FENTANYL CITRATE 25 MCG: 50 INJECTION, SOLUTION INTRAMUSCULAR; INTRAVENOUS at 09:56

## 2017-08-30 RX ADMIN — FENTANYL CITRATE 25 MCG: 50 INJECTION, SOLUTION INTRAMUSCULAR; INTRAVENOUS at 10:06

## 2017-08-30 RX ADMIN — MIDAZOLAM HYDROCHLORIDE 1 MG: 1 INJECTION, SOLUTION INTRAMUSCULAR; INTRAVENOUS at 10:44

## 2017-08-30 RX ADMIN — MIDAZOLAM HYDROCHLORIDE 1 MG: 1 INJECTION, SOLUTION INTRAMUSCULAR; INTRAVENOUS at 10:32

## 2017-08-30 RX ADMIN — MIDAZOLAM HYDROCHLORIDE 1 MG: 1 INJECTION, SOLUTION INTRAMUSCULAR; INTRAVENOUS at 10:05

## 2017-08-30 NOTE — PROGRESS NOTES
Pt arrived to [facility] ambulatory with wife. Changed to gown and assessed. Confirmed allergies and NPO status. IV started in R forearm, labs drawn/sent. Vitals taken, stable. LS clear, HS S1, S2. Mallampati 2 with 3 fingers. Dr Edwige Perez in for consent, questions answered. To CT at 0945, timeout at 1006, start time 1007. Pt tolerated well, stop time 1113. Total of 5 mg versed and 100 mcg fentanyl given. Clean dressing applied to biopsy site on back. Pt returned to [facility] in stable condition, states no pain. Tolerating PO, and wife at bedside. Dr Edwige Perez wants a 1 hour post chest xray to confirm no pneumothorax. Ordered for around noon. 1 hour post bx xray complete. Pt given discharge directions, questions answered. IV removed. Pt dressed and dressing checked, CDI. Taken out to [place] for discharge.

## 2017-08-30 NOTE — DISCHARGE INSTRUCTIONS
Sedation for a Medical Procedure: After Your Visit  Instructions. Sedatives are used to relax you for a procedure. You may or may not be awake during the procedure. Common side effects of sedation medications include:                   Drowsiness, dizziness, euphoria, sleepiness or confusion. I              Unsteady gait. Loss of fine muscle control and delayed reaction time. Visual                       disturbances, difficulty focusing and blurred vision. Impaired memory recall. Follow-up care is a key component for your health and safety. Be sure to make and go to all medical appointments. Call your doctor if you are having problems. It's also a good idea to keep a list of your allergies, medicines with doses and test results on hand    Home care following your sedation procedure: You may experience some of these side effects or you may not be aware of subtle changes in your behavior or reaction time. Because you received these medications, we are giving you the following instructions: Activity   For your safety, you should not drive until the medicine wears off and you can think clearly and react easily. Do not drive for 24 hours. Rest when you feel tired. Getting enough sleep will help you recover. Diet    You can eat your normal diet. If your stomach is upset, try bland, low-fat foods like plain rice, broiled chicken, toast, and yogurt. Drink plenty of fluids unless your doctor advised you to limit your fluids. Do not consume alcoholic beverages for 24 hours. Instructions  Do not make important personal, business, or legal decisions for 24 hours. Move slowly and carefully, do not make sudden position changes. Be alert for dizziness or lightheadedness and move accordingly. Have a responsible person assist you. Do not drive for 24 hours. Do not operate equipment for 24 hours. [employer], power tools, kitchen appliances, etc.)    Discharge medications:  Resume prior to test medications as prescribed by your personal physician. If you have any questions or concerns call Radiology RN at [phone]  After hours call Radiology on-call at [phone]    Call [phone] any time you think you may need emergency care. For example:                Call if you passed out (lost consciousness). The medicine is not wearing off and you cannot think clearly. Watch closely for changes in your health, and be sure to contact your doctor if                  you have any problems. Where can you learn more? Go to [employer].be   Enter G817 in the search box to learn more about \"Sedation for a Medical Procedure: After Your Visit. \"        [address] INSTRUCTIONS    General Instructions:     A biopsy is the removal of a small piece of tissue for microscopic examination or testing. Healthy tissue can be obtained for the purpose of tissue-type matching for transplants. Unhealthy tissues are more commonly biopsied to diagnose disease. General Biopsy:     A mass can grow in any area of the body, and we are taking a specimen as ordered by your doctor. The risks are the same. They include bleeding, pain, and infection. Home Care Instructions: You may resume your regular diet and medication regimen. Do not drink alcohol, drive, or make any important legal decisions in the next 24 hours. Do not lift anything heavier than a gallon of milk until the soreness goes away. You may use over the counter acetaminophen or ibuprofen for the soreness. You may apply an ice pack to the affected area for 20-30 minutes at time for the first 24 hours. After that, you may apply a heat pack.          Call If: You should call your Physician and/or the Radiology Nurse if you have any questions or concerns about the biopsy site. Call if you should have increased pain, fever, redness, drainage, or bleeding more than a small spot on the bandage. Follow-Up Instructions: Please see your ordering doctor as he/she has requested.     To Reach Us:  [phone]  After Hours: [phone]      Patient Signature:  Date: 8/30/2017  Discharging Nurse: Christ Chaparro RN

## 2017-08-30 NOTE — IP AVS SNAPSHOT
[address] [place], [place] [id number] Patient: Daria Chua MRN: [id number] FOS:[birth year] You are allergic to the following Allergen Reactions Lactose Nausea and Vomiting Iodinated Contrast- Oral And Iv Dye Hives Recent Documentation Height Weight BMI Smoking Status 1.88 m 101.2 kg 28.63 kg/m2 Former Smoker Emergency Contacts Name Discharge Info Relation Home Work Mobile Eliel Barragan CAREGIVER [3] Spouse [3] [phone] About your hospitalization You were admitted on:  August 30, 2017 You last received care in the:  [facility] RAD CT You were discharged on:  August 30, 2017 Unit phone number:  [phone] Why you were hospitalized Your primary diagnosis was:  Not on File Providers Seen During Your Hospitalizations Provider Role Specialty Primary office phone Annie Byers NP Attending Provider Nurse Practitioner [phone] Your Primary Care Physician (PCP) Primary Care Physician Office Phone Office Fax 1400 [address] [phone] Follow-up Information None Current Discharge Medication List  
  
ASK your doctor about these medications Dose & Instructions Dispensing Information Comments Morning Noon Evening Bedtime  
 aspirin delayed-release 81 mg tablet Your last dose was: Your next dose is: Take  by mouth daily. Refills:  0  
     
   
   
   
  
 indomethacin 50 mg capsule Commonly known as:  INDOCIN Your last dose was: Your next dose is:    
   
   
 Dose:  50 mg Take 1 Cap by mouth two (2) times a day for 90 days. Quantity:  180 Cap Refills:  0  
     
   
   
   
  
 lisinopril-hydroCHLOROthiazide 20-12.5 mg per tablet Commonly known as:  Ramandeep Pantoja  
   
 Your last dose was: Your next dose is: TAKE 1 TABLET BY MOUTH DAILY Quantity:  30 Tab Refills:  0 Discharge Instructions Sedation for a Medical Procedure: After Your Visit  Instructions. Sedatives are used to relax you for a procedure. You may or may not be awake during the procedure. Common side effects of sedation medications include:    
 
            Drowsiness, dizziness, euphoria, sleepiness or confusion. I 
            Unsteady gait. Loss of fine muscle control and delayed reaction time. Visual                       disturbances, difficulty focusing and blurred vision. Impaired memory recall. Follow-up care is a key component for your health and safety. Be sure to make and go to all medical appointments. Call your doctor if you are having problems. It's also a good idea to keep a list of your allergies, medicines with doses and test results on hand Home care following your sedation procedure: You may experience some of these side effects or you may not be aware of subtle changes in your behavior or reaction time. Because you received these medications, we are giving you the following instructions: Activity For your safety, you should not drive until the medicine wears off and you can think clearly and react easily. Do not drive for 24 hours. Rest when you feel tired. Getting enough sleep will help you recover. Diet You can eat your normal diet. If your stomach is upset, try bland, low-fat foods like plain rice, broiled chicken, toast, and yogurt. Drink plenty of fluids unless your doctor advised you to limit your fluids. Do not consume alcoholic beverages for 24 hours. Instructions Do not make important personal, business, or legal decisions for 24 hours. Move slowly and carefully, do not make sudden position changes. Be alert for dizziness or lightheadedness and move accordingly. Have a responsible person assist you. Do not drive for 24 hours. Do not operate equipment for 24 hours. [employer] mowers, power tools, kitchen appliances, etc.) Discharge medications: 
Resume prior to test medications as prescribed by your personal physician. If you have any questions or concerns call Radiology RN at [phone] After hours call Radiology on-call at [phone] Call [phone] any time you think you may need emergency care. For example:  
             Call if you passed out (lost consciousness). The medicine is not wearing off and you cannot think clearly. Watch closely for changes in your health, and be sure to contact your doctor if                  you have any problems. Where can you learn more? Go to [employer].be Enter G116 in the search box to learn more about \"Sedation for a Medical Procedure: After Your Visit. \"   
 
 
111 [facility] [address] BIOPSY DISCHARGE INSTRUCTIONS General Instructions: A biopsy is the removal of a small piece of tissue for microscopic examination or testing. Healthy tissue can be obtained for the purpose of tissue-type matching for transplants. Unhealthy tissues are more commonly biopsied to diagnose disease. General Biopsy: 
   A mass can grow in any area of the body, and we are taking a specimen as ordered by your doctor. The risks are the same. They include bleeding, pain, and infection. Home Care Instructions: You may resume your regular diet and medication regimen. Do not drink alcohol, drive, or make any important legal decisions in the next 24 hours. Do not lift anything heavier than a gallon of milk until the soreness goes away. You may use over the counter acetaminophen or ibuprofen for the soreness.  You may apply an ice pack to the affected area for 20-30 minutes at time for the first 24 hours. After that, you may apply a heat pack. Call If: You should call your Physician and/or the Radiology Nurse if you have any questions or concerns about the biopsy site. Call if you should have increased pain, fever, redness, drainage, or bleeding more than a small spot on the bandage. Follow-Up Instructions: Please see your ordering doctor as he/she has requested. To Reach Us:   After Hours: [phone] Patient Signature: 
Date: 8/30/2017 Discharging Nurse: Keila Oates RN Discharge Orders None [facility]! Dear Pino Ellison: Thank you for requesting a Hightower account. Our records indicate that you already have an active Hightower account. You can access your account anytime at [email or web address] Did you know that you can access your hospital and ER discharge instructions at any time in Hightower? You can also review all of your test results from your hospital stay or ER visit. Additional Information If you have questions, please visit the Frequently Asked Questions section of the Hightower website at [email or web address] Remember, Hightower is NOT to be used for urgent needs. For medical emergencies, dial 911. Now available from your iPhone and Android! General Information Please provide this summary of care documentation to your next provider. Patient Signature:  ____________________________________________________________ Date:  ____________________________________________________________  
  
Briseida Hidalgoncer Provider Signature:  ____________________________________________________________ Date:  ____________________________________________________________

## 2017-08-31 PROCEDURE — 77030003504 HC NDL BIOP TISS COOK -A

## 2017-09-06 DIAGNOSIS — C82.80: Primary | ICD-10-CM

## 2017-09-13 ENCOUNTER — HOSPITAL ENCOUNTER (OUTPATIENT)
Dept: CT IMAGING | Age: 57
Discharge: HOME | End: 2017-09-13
Attending: NURSE PRACTITIONER
Payer: COMMERCIAL

## 2017-09-13 VITALS
HEIGHT: 74 IN | HEART RATE: 75 BPM | WEIGHT: 228 LBS | OXYGEN SATURATION: 97 % | BODY MASS INDEX: 29.26 KG/M2 | RESPIRATION RATE: 17 BRPM | SYSTOLIC BLOOD PRESSURE: 130 MMHG | DIASTOLIC BLOOD PRESSURE: 95 MMHG | TEMPERATURE: 98 F

## 2017-09-13 DIAGNOSIS — C82.80: ICD-10-CM

## 2017-09-13 LAB
BASOPHILS # BLD: 0 K/UL (ref 0–0.1)
BASOPHILS NFR BLD: 1 % (ref 0–1)
EOSINOPHIL # BLD: 0.4 K/UL (ref 0–0.4)
EOSINOPHIL NFR BLD: 10 % (ref 0–7)
ERYTHROCYTE [DISTWIDTH] IN BLOOD BY AUTOMATED COUNT: 12.6 % (ref 11.5–14.5)
HCT VFR BLD AUTO: 39 % (ref 36.6–50.3)
HGB BLD-MCNC: 13.2 G/DL (ref 12.1–17)
LYMPHOCYTES # BLD: 1.1 K/UL (ref 0.8–3.5)
LYMPHOCYTES NFR BLD: 27 % (ref 12–49)
MCH RBC QN AUTO: 32.5 PG (ref 26–34)
MCHC RBC AUTO-ENTMCNC: 33.8 G/DL (ref 30–36.5)
MCV RBC AUTO: 96.1 FL (ref 80–99)
MONOCYTES # BLD: 0.4 K/UL (ref 0–1)
MONOCYTES NFR BLD: 9 % (ref 5–13)
NEUTS SEG # BLD: 2.3 K/UL (ref 1.8–8)
NEUTS SEG NFR BLD: 53 % (ref 32–75)
PLATELET # BLD AUTO: 176 K/UL (ref 150–400)
RBC # BLD AUTO: 4.06 M/UL (ref 4.1–5.7)
WBC # BLD AUTO: 4.3 K/UL (ref 4.1–11.1)

## 2017-09-13 PROCEDURE — 74011000250 HC RX REV CODE- 250: Performed by: RADIOLOGY

## 2017-09-13 PROCEDURE — 38221 DX BONE MARROW BIOPSIES: CPT

## 2017-09-13 PROCEDURE — 99153 MOD SED SAME PHYS/QHP EA: CPT

## 2017-09-13 PROCEDURE — 85025 COMPLETE CBC W/AUTO DIFF WBC: CPT | Performed by: RADIOLOGY

## 2017-09-13 PROCEDURE — 88311 DECALCIFY TISSUE: CPT | Performed by: NURSE PRACTITIONER

## 2017-09-13 PROCEDURE — 88313 SPECIAL STAINS GROUP 2: CPT | Performed by: NURSE PRACTITIONER

## 2017-09-13 PROCEDURE — 85097 BONE MARROW INTERPRETATION: CPT | Performed by: NURSE PRACTITIONER

## 2017-09-13 PROCEDURE — 36415 COLL VENOUS BLD VENIPUNCTURE: CPT | Performed by: RADIOLOGY

## 2017-09-13 PROCEDURE — 77030003666 HC NDL SPINAL BD -A

## 2017-09-13 PROCEDURE — 77030028872 HC BN BIOP NDL ON CNTRL TY TELE -C

## 2017-09-13 PROCEDURE — 88305 TISSUE EXAM BY PATHOLOGIST: CPT | Performed by: NURSE PRACTITIONER

## 2017-09-13 PROCEDURE — 88342 IMHCHEM/IMCYTCHM 1ST ANTB: CPT | Performed by: NURSE PRACTITIONER

## 2017-09-13 PROCEDURE — 74011250636 HC RX REV CODE- 250/636: Performed by: RADIOLOGY

## 2017-09-13 PROCEDURE — 99152 MOD SED SAME PHYS/QHP 5/>YRS: CPT

## 2017-09-13 RX ORDER — MIDAZOLAM HYDROCHLORIDE 1 MG/ML
5 INJECTION, SOLUTION INTRAMUSCULAR; INTRAVENOUS
Status: DISCONTINUED | OUTPATIENT
Start: 2017-09-13 | End: 2017-09-13 | Stop reason: ALTCHOICE

## 2017-09-13 RX ORDER — FENTANYL CITRATE 50 UG/ML
100 INJECTION, SOLUTION INTRAMUSCULAR; INTRAVENOUS
Status: DISCONTINUED | OUTPATIENT
Start: 2017-09-13 | End: 2017-09-13 | Stop reason: ALTCHOICE

## 2017-09-13 RX ADMIN — FENTANYL CITRATE 25 MCG: 50 INJECTION, SOLUTION INTRAMUSCULAR; INTRAVENOUS at 10:54

## 2017-09-13 RX ADMIN — MIDAZOLAM HYDROCHLORIDE 1 MG: 1 INJECTION, SOLUTION INTRAMUSCULAR; INTRAVENOUS at 10:53

## 2017-09-13 RX ADMIN — MIDAZOLAM HYDROCHLORIDE 1 MG: 1 INJECTION, SOLUTION INTRAMUSCULAR; INTRAVENOUS at 10:49

## 2017-09-13 RX ADMIN — MIDAZOLAM HYDROCHLORIDE 1 MG: 1 INJECTION, SOLUTION INTRAMUSCULAR; INTRAVENOUS at 10:35

## 2017-09-13 RX ADMIN — FENTANYL CITRATE 25 MCG: 50 INJECTION, SOLUTION INTRAMUSCULAR; INTRAVENOUS at 10:49

## 2017-09-13 RX ADMIN — MIDAZOLAM HYDROCHLORIDE 1 MG: 1 INJECTION, SOLUTION INTRAMUSCULAR; INTRAVENOUS at 10:30

## 2017-09-13 RX ADMIN — SODIUM BICARBONATE 1 ML: 0.2 INJECTION, SOLUTION INTRAVENOUS at 10:51

## 2017-09-13 RX ADMIN — FENTANYL CITRATE 25 MCG: 50 INJECTION, SOLUTION INTRAMUSCULAR; INTRAVENOUS at 10:30

## 2017-09-13 RX ADMIN — FENTANYL CITRATE 25 MCG: 50 INJECTION, SOLUTION INTRAMUSCULAR; INTRAVENOUS at 10:35

## 2017-09-13 NOTE — H&P
Interventional Radiology History and Physical (Inpatient)    Patient: Mark Van 56 y.o. male     Referring Physician:  Gay Kent NP    Chief Complaint: No chief complaint on file. History of Present Illness: lymphoma, conscious sedation (Versed and fentanyl) for bone marrow biopsy    History:  Past Medical History:   Diagnosis Date    Cancer ([facility] [address]     NHL    Chemotherapy follow-up examination     NIKO 9-25-15= Exercise stress echo is normal. Walked 9 minutes, normal EF    GERD (gastroesophageal reflux disease)     Gout 10/24/2011    Hypertension     Lymphoma, small cleaved-cell, follicular (HCC)     recurrent,currently on remission     Family History   Problem Relation Age of Onset    Lung Disease Mother      COPD    Heart Disease Father      polymyalgia rheumatica    Hypertension Brother     Cancer Brother      testicular cancer     Social History     Social History    Marital status:      Spouse name: N/A    Number of children: N/A    Years of education: N/A     Occupational History    Not on file. Social History Main Topics    Smoking status: Former Smoker     Packs/day: 1.00     Years: 7.00     Quit date: 9/4/1999    Smokeless tobacco: Never Used    Alcohol use 3.5 oz/week     7 Standard drinks or equivalent per week    Drug use: No    Sexual activity: Yes     Partners: Female     Other Topics Concern    Not on file     Social History Narrative    Works in [employer] at [employer]. Allergies: Allergies   Allergen Reactions    Lactose Nausea and Vomiting    Iodinated Contrast- Oral And Iv Dye Hives       Current Medications:  Current Outpatient Prescriptions   Medication Sig    lisinopril-hydroCHLOROthiazide (PRINZIDE, ZESTORETIC) 20-12.5 mg per tablet TAKE 1 TABLET BY MOUTH DAILY    aspirin delayed-release 81 mg tablet Take  by mouth daily.      Current Facility-Administered Medications   Medication Dose Route Frequency    midazolam (VERSED) injection 5 mg  5 mg IntraVENous RAD PRN    fentaNYL citrate (PF) injection 100 mcg  100 mcg IntraVENous RAD PRN        Physical Exam:  There were no vitals taken for this visit. GENERAL: alert, cooperative, no distress, appears stated age, LUNG: clear to auscultation bilaterally, HEART: regular rate and rhythm    Findings/Diagnosis: lymphoma, conscious sedation (Versed and fentanyl) for bone marrow biopsy    Alerts:    Hospital Problems  Date Reviewed: 6/7/2017    None          Laboratory:    No results for input(s): HGB, HGBEXT, HCT, HCTEXT, WBC, PLT, PLTEXT, INR, BUN, CREA, K, CRCLT, HGBEXT, HCTEXT, PLTEXT in the last 72 hours. No lab exists for component: PTT, PT, INREXT      Plan of Care/Planned Procedure:  Risks, benefits, and alternatives reviewed with patient and he agrees to proceed with the procedure. Full dictated report to follow.

## 2017-09-13 NOTE — IP AVS SNAPSHOT
Alexei Linton 
 
 
 [address] [place] [address] 
[phone] Patient: Leida Sosa MRN: [id number] BIE:2/53/2909 You are allergic to the following Allergen Reactions Lactose Nausea and Vomiting Iodinated Contrast- Oral And Iv Dye Hives Recent Documentation Height Weight BMI Smoking Status 1.88 m 103.4 kg 29.27 kg/m2 Former Smoker Emergency Contacts Name Discharge Info Relation Home Work Mobile Margoth Needle CAREGIVER [3] Spouse [3] [phone] About your hospitalization You were admitted on:  September 13, 2017 You last received care in the:  [facility] RAD CT You were discharged on:  September 13, 2017 Unit phone number:  [phone] Why you were hospitalized Your primary diagnosis was:  Not on File Providers Seen During Your Hospitalizations Provider Role Specialty Primary office phone Ricardo Daniels NP Attending Provider Nurse Practitioner [phone] Your Primary Care Physician (PCP) Primary Care Physician Office Phone Office Fax 1400 [facility], [address] [place] [address] [phone] Follow-up Information None Current Discharge Medication List  
  
ASK your doctor about these medications Dose & Instructions Dispensing Information Comments Morning Noon Evening Bedtime  
 aspirin delayed-release 81 mg tablet Your last dose was: Your next dose is: Take  by mouth daily. Refills:  0  
     
   
   
   
  
 lisinopril-hydroCHLOROthiazide 20-12.5 mg per tablet Commonly known as:  Sarah Stephenson Your last dose was: Your next dose is: TAKE 1 TABLET BY MOUTH DAILY Quantity:  30 Tab Refills:  0 Discharge Instructions Bone Marrow Aspiration and Biopsy: What to Expect at [facility] Your Recovery The biopsy site may feel sore for several days. It can help to walk, take pain medicine, and put ice packs on the site. You will probably be able to return to work and your usual activities the day after the procedure. Your doctor or nurse will call you with the results of your test. 
This care sheet gives you a general idea about how long it will take for you to recover. But each person recovers at a different pace. Follow the steps below to get better as quickly as possible. How can you care for yourself at home? Activity · Rest when you feel tired. Getting enough sleep will help you recover. · You may drive when you are no longer taking pain pills and can quickly move your foot from the gas pedal to the brake. You must also be able to sit comfortably for a long period of time, even if you do not plan to go far. You might get caught in traffic. · Most people are able to return to work the day after the procedure. Medicines · Your doctor will tell you if and when you can restart your medicines. He or she will also give you instructions about taking any new medicines. · If you take blood thinners, such as warfarin (Coumadin), clopidogrel (Plavix), or aspirin, be sure to talk to your doctor. He or she will tell you if and when to start taking those medicines again. Make sure that you understand exactly what your doctor wants you to do. · Be safe with medicines. Take pain medicines exactly as directed. ¨ If the doctor gave you a prescription medicine for pain, take it as prescribed. ¨ If you are not taking a prescription pain medicine, take an over-the-counter medicine such as acetaminophen (Tylenol), ibuprofen (Advil, Motrin), or naproxen (Aleve). Read and follow all instructions on the label. ¨ Do not take two or more pain medicines at the same time unless the doctor told you to. Many pain medicines have acetaminophen, which is Tylenol. Too much acetaminophen (Tylenol) can be harmful. · If you think your pain medicine is making you sick to your stomach: 
¨ Take your medicine after meals (unless your doctor has told you not to). ¨ Ask your doctor for a different pain medicine. · If your doctor prescribed antibiotics, take them as directed. Do not stop taking them just because you feel better. Ice · Put ice or a cold pack on the biopsy site for 10 to 20 minutes at a time. Put a thin cloth between the ice and your skin. Follow-up care is a key part of your treatment and safety. Be sure to make and go to all appointments, and call your doctor if you are having problems. It's also a good idea to know your test results and keep a list of the medicines you take. When should you call for help? Call 911 anytime you think you may need emergency care. For example, call if: 
· You passed out (lost consciousness). Call your doctor now or seek immediate medical care if: 
· You have signs of infection, such as: 
¨ Increased pain, swelling, warmth, or redness. ¨ Red streaks leading from the biopsy site. ¨ Pus draining from the biopsy site. ¨ Swollen lymph nodes in your neck, armpits, or groin. ¨ A fever. Watch closely for any changes in your health, and be sure to contact your doctor if: 
· You are not getting better as expected. Where can you learn more? Go to http://michelle-miroslava.info/. Enter E148 in the search box to learn more about \"Bone Marrow Aspiration and Biopsy: What to Expect at Home. \" Current as of: October 14, 2016 Content Version: 11.3 © 6349-3677 Store Eyes. Care instructions adapted under license by Luminetx (which disclaims liability or warranty for this information). If you have questions about a medical condition or this instruction, always ask your healthcare professional. [place] [address] any warranty or liability for your use of this information. Sedation for a Medical Procedure: Care Instructions Your Care Instructions For a minor procedure or surgery, you will get a sedative to help you relax. This drug will make you sleepy. It is usually given in a vein (by IV). A shot may also be used to numb the area. If you had local anesthesia, you may feel some pain and discomfort as it wears off. If you have pain, don't be afraid to say so. Pain medicine works better if you take it before the pain gets bad. Common side effects from sedation include: · Feeling sleepy. (Your doctors and nurses will make sure you are not too sleepy to go home.) · Nausea and vomiting. This usually does not last long. · Feeling tired. Follow-up care is a key part of your treatment and safety. Be sure to make and go to all appointments, and call your doctor if you are having problems. It's also a good idea to know your test results and keep a list of the medicines you take. How can you care for yourself at home? Activity · Don't do anything for 24 hours that requires attention to detail. It takes time for the medicine effects to completely wear off. · For your safety, you should not drive or operate any machinery that could be dangerous until the medicine wears off and you can think clearly and react easily. · Rest when you feel tired. Getting enough sleep will help you recover. Diet · You can eat your normal diet, unless your doctor gives you other instructions. If your stomach is upset, try clear liquids and bland, low-fat foods like plain toast or rice. · Drink plenty of fluids (unless your doctor tells you not to). · Don't drink alcohol for 24 hours. Medicines · Be safe with medicines. Read and follow all instructions on the label. ¨ If the doctor gave you a prescription medicine for pain, take it as prescribed. ¨ If you are not taking a prescription pain medicine, ask your doctor if you can take an over-the-counter medicine.  
· If you think your pain medicine is making you sick to your stomach: 
 ¨ Take your medicine after meals (unless your doctor has told you not to). ¨ Ask your doctor for a different pain medicine. When should you call for help? Call 911 anytime you think you may need emergency care. For example, call if: 
· You have severe trouble breathing. · You passed out (lost consciousness). Call your doctor now or seek immediate medical care if: 
· You have trouble breathing. · You have ongoing or worsening nausea or vomiting. · You have a fever. · You have a new or worse headache. · The medicine is not wearing off and you can't think clearly. Watch closely for changes in your health, and be sure to contact your doctor if: 
· You do not get better as expected. Where can you learn more? Go to http://michelle-miroslava.info/. Enter R669 in the search box to learn more about \"Sedation for a Medical Procedure: Care Instructions. \" Current as of: August 14, 2016 Content Version: 11.3 © 0016-2554 Brighter.com. Care instructions adapted under license by [employer] (which disclaims liability or warranty for this information). If you have questions about a medical condition or this instruction, always ask your healthcare professional. [place] [address] any warranty or liability for your use of this information. Discharge Orders None Introducing [facility]! Dear Sonya Frye: Thank you for requesting a [employer] account. Our records indicate that you already have an active ADINCON account. You can access your account anytime at [email or web address] Did you know that you can access your hospital and ER discharge instructions at any time in ADINCON? You can also review all of your test results from your hospital stay or ER visit. Additional Information If you have questions, please visit the Frequently Asked Questions section of the ADINCON website at [email or web address] Remember, MyChart is NOT to be used for urgent needs. For medical emergencies, dial 911. Now available from your iPhone and Android! General Information Please provide this summary of care documentation to your next provider. Patient Signature:  ____________________________________________________________ Date:  ____________________________________________________________  
  
[facility] Provider Signature:  ____________________________________________________________ Date:  ____________________________________________________________

## 2017-09-13 NOTE — PROGRESS NOTES
0930 Pt rec'd ambulatory from patient access area to [facility] for CT guided bone marrow biopsy. Pt is alert and oriented x 3, denies discomfort at present. Lungs CTA, S1S2 heard. 22 GA PIV initiated to L wrist, 2 attempts, blood to lab. Dr Baldomero Silver in and discussed procedure, risks and benefits with patient and consent obtained. Plan for sedation made. 1030 To CT    1100 Procedure complete. Pt drowsy, responds to voice. Procedure start time 1030, stop time 1059. Total of Versed 4 mg and Fentanyl 100 mcg IV used for procedure and pt tolerated well. Pt access notified pt is finished with procedure and sleeping and asked them to relay information to pt's wife. Dressing to lower back is dry and intact. [address] Wife to bedside. Patient is awake and alert at present, denies discomfort. REviewed discharge instructions with wife and written copy provided to her. Patient taking fluids without difficulty. 1200 PIV removed and gauze dressing placed. Discharge instructions reviewed with patient and his wife and they voice understanding. Written copy provided to them. Pt discharged with all belongings via wheelchair to vehicle to care of his wife.

## 2017-09-13 NOTE — DISCHARGE INSTRUCTIONS
Bone Marrow Aspiration and Biopsy: What to Expect at Home  Your Recovery  The biopsy site may feel sore for several days. It can help to walk, take pain medicine, and put ice packs on the site. You will probably be able to return to work and your usual activities the day after the procedure. Your doctor or nurse will call you with the results of your test.  This care sheet gives you a general idea about how long it will take for you to recover. But each person recovers at a different pace. Follow the steps below to get better as quickly as possible. How can you care for yourself at home? Activity  · Rest when you feel tired. Getting enough sleep will help you recover. · You may drive when you are no longer taking pain pills and can quickly move your foot from the gas pedal to the brake. You must also be able to sit comfortably for a long period of time, even if you do not plan to go far. You might get caught in traffic. · Most people are able to return to work the day after the procedure. Medicines  · Your doctor will tell you if and when you can restart your medicines. He or she will also give you instructions about taking any new medicines. · If you take blood thinners, such as warfarin (Coumadin), clopidogrel (Plavix), or aspirin, be sure to talk to your doctor. He or she will tell you if and when to start taking those medicines again. Make sure that you understand exactly what your doctor wants you to do. · Be safe with medicines. Take pain medicines exactly as directed. ¨ If the doctor gave you a prescription medicine for pain, take it as prescribed. ¨ If you are not taking a prescription pain medicine, take an over-the-counter medicine such as acetaminophen (Tylenol), ibuprofen (Advil, Motrin), or naproxen (Aleve). Read and follow all instructions on the label. ¨ Do not take two or more pain medicines at the same time unless the doctor told you to.  Many pain medicines have acetaminophen, which is Tylenol. Too much acetaminophen (Tylenol) can be harmful. · If you think your pain medicine is making you sick to your stomach:  ¨ Take your medicine after meals (unless your doctor has told you not to). ¨ Ask your doctor for a different pain medicine. · If your doctor prescribed antibiotics, take them as directed. Do not stop taking them just because you feel better. Ice  · Put ice or a cold pack on the biopsy site for 10 to 20 minutes at a time. Put a thin cloth between the ice and your skin. Follow-up care is a key part of your treatment and safety. Be sure to make and go to all appointments, and call your doctor if you are having problems. It's also a good idea to know your test results and keep a list of the medicines you take. When should you call for help? Call 911 anytime you think you may need emergency care. For example, call if:  · You passed out (lost consciousness). Call your doctor now or seek immediate medical care if:  · You have signs of infection, such as:  ¨ Increased pain, swelling, warmth, or redness. ¨ Red streaks leading from the biopsy site. ¨ Pus draining from the biopsy site. ¨ Swollen lymph nodes in your neck, armpits, or groin. ¨ A fever. Watch closely for any changes in your health, and be sure to contact your doctor if:  · You are not getting better as expected. Where can you learn more? Go to http://michelle-miroslava.info/. Enter E148 in the search box to learn more about \"Bone Marrow Aspiration and Biopsy: What to Expect at Home. \"  Current as of: October 14, 2016  Content Version: 11.3  © 6158-6460 Geomagic. Care instructions adapted under license by Spoken Communications (which disclaims liability or warranty for this information).  If you have questions about a medical condition or this instruction, always ask your healthcare professional. [place] [address] any warranty or liability for your use of this information. Sedation for a Medical Procedure: Care Instructions  Your Care Instructions  For a minor procedure or surgery, you will get a sedative to help you relax. This drug will make you sleepy. It is usually given in a vein (by IV). A shot may also be used to numb the area. If you had local anesthesia, you may feel some pain and discomfort as it wears off. If you have pain, don't be afraid to say so. Pain medicine works better if you take it before the pain gets bad. Common side effects from sedation include:  · Feeling sleepy. (Your doctors and nurses will make sure you are not too sleepy to go home.)  · Nausea and vomiting. This usually does not last long. · Feeling tired. Follow-up care is a key part of your treatment and safety. Be sure to make and go to all appointments, and call your doctor if you are having problems. It's also a good idea to know your test results and keep a list of the medicines you take. How can you care for yourself at home? Activity  · Don't do anything for 24 hours that requires attention to detail. It takes time for the medicine effects to completely wear off. · For your safety, you should not drive or operate any machinery that could be dangerous until the medicine wears off and you can think clearly and react easily. · Rest when you feel tired. Getting enough sleep will help you recover. Diet  · You can eat your normal diet, unless your doctor gives you other instructions. If your stomach is upset, try clear liquids and bland, low-fat foods like plain toast or rice. · Drink plenty of fluids (unless your doctor tells you not to). · Don't drink alcohol for 24 hours. Medicines  · Be safe with medicines. Read and follow all instructions on the label. ¨ If the doctor gave you a prescription medicine for pain, take it as prescribed. ¨ If you are not taking a prescription pain medicine, ask your doctor if you can take an over-the-counter medicine.   · If you think your pain medicine is making you sick to your stomach:  ¨ Take your medicine after meals (unless your doctor has told you not to). ¨ Ask your doctor for a different pain medicine. When should you call for help? Call 911 anytime you think you may need emergency care. For example, call if:  · You have severe trouble breathing. · You passed out (lost consciousness). Call your doctor now or seek immediate medical care if:  · You have trouble breathing. · You have ongoing or worsening nausea or vomiting. · You have a fever. · You have a new or worse headache. · The medicine is not wearing off and you can't think clearly. Watch closely for changes in your health, and be sure to contact your doctor if:  · You do not get better as expected. Where can you learn more? Go to http://michelle-miroslava.info/. Enter U653 in the search box to learn more about \"Sedation for a Medical Procedure: Care Instructions. \"  Current as of: August 14, 2016  Content Version: 11.3  © 0584-2522 PresentationTube, Incorporated. Care instructions adapted under license by Nextinit (which disclaims liability or warranty for this information). If you have questions about a medical condition or this instruction, always ask your healthcare professional. [address] any warranty or liability for your use of this information.

## 2017-10-31 ENCOUNTER — OFFICE VISIT (OUTPATIENT)
Dept: INTERNAL MEDICINE CLINIC | Age: 57
End: 2017-10-31

## 2017-10-31 VITALS
WEIGHT: 228.6 LBS | HEART RATE: 93 BPM | SYSTOLIC BLOOD PRESSURE: 126 MMHG | HEIGHT: 74 IN | BODY MASS INDEX: 29.34 KG/M2 | TEMPERATURE: 98.4 F | DIASTOLIC BLOOD PRESSURE: 84 MMHG | RESPIRATION RATE: 18 BRPM | OXYGEN SATURATION: 98 %

## 2017-10-31 DIAGNOSIS — C82.80: ICD-10-CM

## 2017-10-31 DIAGNOSIS — Z00.00 PHYSICAL EXAM: Primary | ICD-10-CM

## 2017-10-31 DIAGNOSIS — I10 ESSENTIAL HYPERTENSION: ICD-10-CM

## 2017-10-31 NOTE — MR AVS SNAPSHOT
Visit Information Date & Time Provider Department Dept. Phone Encounter #  
 10/31/2017  9:00 AM Brittany Harding, [address] [facility],[address] Internal Medicine [phone] [id number] Upcoming Health Maintenance Date Due Hepatitis C Screening 1960 Pneumococcal 19-64 Highest Risk (2 of 3 - PCV13) 1/1/2008 DTaP/Tdap/Td series (1 - Tdap) 11/26/2016 COLONOSCOPY 10/15/2022 Allergies as of 10/31/2017  Review Complete On: 10/31/2017 By: Brittany Harding MD  
  
 Severity Noted Reaction Type Reactions Lactose High 07/15/2013   Intolerance Nausea and Vomiting Iodinated Contrast- Oral And Iv Dye Medium 08/18/2015    Hives Current Immunizations  Reviewed on 9/22/2016 Name Date H1N1 FLU VACCINE 1/19/2010 Influenza Vaccine 10/10/2017, 12/28/2015, 10/26/2014 Pneumococcal Vaccine (Unspecified Type) 1/1/2007 Td, Adsorbed PF 11/25/2016  5:25 PM  
  
 Not reviewed this visit You Were Diagnosed With   
  
 Codes Comments Physical exam    -  Primary ICD-10-CM: Z00.00 ICD-9-CM: V70.9 Essential hypertension     ICD-10-CM: I10 
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  
 29.35 kg/m 2 2.33 m 2 Preferred Pharmacy Pharmacy Name Phone [address] [place] [address] [address] [phone] Your Updated Medication List  
  
   
This list is accurate as of: 10/31/17 10:03 AM.  Always use your most recent med list.  
  
  
  
  
 aspirin delayed-release 81 mg tablet Take  by mouth daily. lisinopril-hydroCHLOROthiazide 20-12.5 mg per tablet Commonly known as:  PRINZIDE, ZESTORETIC  
TAKE 1 TABLET BY MOUTH DAILY Introducing [facility]! Dear Renata Ziegler: Thank you for requesting a [employer] account. Our records indicate that you already have an active [employer] account. You can access your account anytime at [email or web address] Did you know that you can access your hospital and ER discharge instructions at any time in [employer]? You can also review all of your test results from your hospital stay or ER visit. Additional Information If you have questions, please visit the Frequently Asked Questions section of the [employer] website at [email or web address] Remember, [employer] is NOT to be used for urgent needs. For medical emergencies, dial 911. Now available from your iPhone and Android! Please provide this summary of care documentation to your next provider. Your primary care clinician is listed as Guerita Smith. If you have any questions after today's visit, please call [phone].

## 2017-10-31 NOTE — PROGRESS NOTES
Written by Chele Abraham, as dictated by Dr. Tawanda Mcfarland MD.    Tia Thomas is a 56 y.o. male. HPI  The patient comes in today for a complete physical examination. He has a hx of lymphoma and is followed by Dr. Oscar Engle (oncology), who he last saw in 08/2017. He finished a course of Rituxan in July 2016. A chest CT on 08/04 showed a \"slight increased size of retroperitoneal soft tissue density anterior to the left adrenal gland, otherwise there is no interval change. \" A bone marrow biopsy in 09/2017 showed no lymphoma in his bone marrow. He also follows with Dr. Keith Hong at [facility], who is a lymphoma specialist.    His BP is normal in the office today at 126/84, and he is compliant on lisinopril-hctz. He has not been taking B12 supplements and his levels were 437 in 06/2017. He experiences seasonal allergies which he treats with OTC medication. He denies sore throat, congestion, constipation. Patient Active Problem List   Diagnosis Code    HTN (hypertension) I10    Gout M10.9    Lymphoma, follicular, small and large cleaved-cell ([facility] [address] C82.80    Chemotherapy follow-up examination Z09        Current Outpatient Prescriptions on File Prior to Visit   Medication Sig Dispense Refill    lisinopril-hydroCHLOROthiazide (PRINZIDE, ZESTORETIC) 20-12.5 mg per tablet TAKE 1 TABLET BY MOUTH DAILY 30 Tab 0    aspirin delayed-release 81 mg tablet Take  by mouth daily. No current facility-administered medications on file prior to visit.         Allergies   Allergen Reactions    Lactose Nausea and Vomiting    Iodinated Contrast- Oral And Iv Dye Hives       Past Medical History:   Diagnosis Date    Cancer ([facility] [address]     NHL    Chemotherapy follow-up examination     NIKO 9-25-15= Exercise stress echo is normal. Walked 9 minutes, normal EF    GERD (gastroesophageal reflux disease)     Gout 10/24/2011    Hypertension     Lymphoma, small cleaved-cell, follicular [facility])     recurrent,currently on remission       Past Surgical History:   Procedure Laterality Date    HX HEENT      lymph node resection-midline,neck    HX TRANSPLANT  10/2013    bone marrow transplant       Family History   Problem Relation Age of Onset    Lung Disease Mother      COPD    Heart Disease Father      polymyalgia rheumatica    Hypertension Brother     Cancer Brother      testicular cancer       Social History     Social History    Marital status:      Spouse name: N/A    Number of children: N/A    Years of education: N/A     Occupational History    Not on file. Social History Main Topics    Smoking status: Former Smoker     Packs/day: 1.00     Years: 7.00     Quit date: 9/4/1999    Smokeless tobacco: Never Used    Alcohol use 3.5 oz/week     7 Standard drinks or equivalent per week    Drug use: No    Sexual activity: Yes     Partners: Female     Other Topics Concern    Not on file     Social History Narrative    Works in [employer]. Hospital Outpatient Visit on 09/13/2017   Component Date Value Ref Range Status    WBC 09/13/2017 4.3  4.1 - 11.1 K/uL Final    RBC 09/13/2017 4.06* 4.10 - 5.70 M/uL Final    HGB 09/13/2017 13.2  12.1 - 17.0 g/dL Final    HCT 09/13/2017 39.0  36.6 - 50.3 % Final    MCV 09/13/2017 96.1  80.0 - 99.0 FL Final    MCH 09/13/2017 32.5  26.0 - 34.0 PG Final    MCHC 09/13/2017 33.8  30.0 - 36.5 g/dL Final    RDW 09/13/2017 12.6  11.5 - 14.5 % Final    PLATELET 84/63/7441 715  150 - 400 K/uL Final    NEUTROPHILS 09/13/2017 53  32 - 75 % Final    LYMPHOCYTES 09/13/2017 27  12 - 49 % Final    MONOCYTES 09/13/2017 9  5 - 13 % Final    EOSINOPHILS 09/13/2017 10* 0 - 7 % Final    BASOPHILS 09/13/2017 1  0 - 1 % Final    ABS. NEUTROPHILS 09/13/2017 2.3  1.8 - 8.0 K/UL Final    ABS. LYMPHOCYTES 09/13/2017 1.1  0.8 - 3.5 K/UL Final    ABS. MONOCYTES 09/13/2017 0.4  0.0 - 1.0 K/UL Final    ABS.  EOSINOPHILS 09/13/2017 0.4  0.0 - 0.4 K/UL Final    ABS. BASOPHILS 09/13/2017 0.0  0.0 - 0.1 K/UL Final   Hospital Outpatient Visit on 08/30/2017   Component Date Value Ref Range Status    WBC 08/30/2017 4.6  4.1 - 11.1 K/uL Final    RBC 08/30/2017 4.48  4.10 - 5.70 M/uL Final    HGB 08/30/2017 14.9  12.1 - 17.0 g/dL Final    HCT 08/30/2017 43.4  36.6 - 50.3 % Final    MCV 08/30/2017 96.9  80.0 - 99.0 FL Final    MCH 08/30/2017 33.3  26.0 - 34.0 PG Final    MCHC 08/30/2017 34.3  30.0 - 36.5 g/dL Final    RDW 08/30/2017 12.4  11.5 - 14.5 % Final    PLATELET 25/50/9873 960  150 - 400 K/uL Final    NEUTROPHILS 08/30/2017 51  32 - 75 % Final    LYMPHOCYTES 08/30/2017 27  12 - 49 % Final    MONOCYTES 08/30/2017 11  5 - 13 % Final    EOSINOPHILS 08/30/2017 10* 0 - 7 % Final    BASOPHILS 08/30/2017 1  0 - 1 % Final    ABS. NEUTROPHILS 08/30/2017 2.3  1.8 - 8.0 K/UL Final    ABS. LYMPHOCYTES 08/30/2017 1.2  0.8 - 3.5 K/UL Final    ABS. MONOCYTES 08/30/2017 0.5  0.0 - 1.0 K/UL Final    ABS. EOSINOPHILS 08/30/2017 0.5* 0.0 - 0.4 K/UL Final    ABS. BASOPHILS 08/30/2017 0.1  0.0 - 0.1 K/UL Final    INR 08/30/2017 1.0  0.9 - 1.1   Final    Prothrombin time 08/30/2017 10.0  9.0 - 11.1 sec Final    aPTT 08/30/2017 31.8  22.1 - 32.5 sec Final    aPTT, therapeutic range 08/30/2017      58.0 - 77.0 SECS Final    Fibrinogen 08/30/2017 316  200 - 475 mg/dL Final       Review of Systems   Constitutional: Negative for malaise/fatigue. HENT: Negative for congestion. Eyes: Negative for blurred vision and pain. Respiratory: Negative for cough and shortness of breath. Cardiovascular: Negative for chest pain and palpitations. Gastrointestinal: Negative for abdominal pain and heartburn. Genitourinary: Negative for frequency and urgency. Musculoskeletal: Negative for joint pain and myalgias. Neurological: Negative for dizziness, tingling, sensory change, weakness and headaches.    Endo/Heme/Allergies: Positive for environmental allergies. Psychiatric/Behavioral: Negative for depression, memory loss and substance abuse. Visit Vitals    /84 (BP 1 Location: Right arm, BP Patient Position: Sitting)    Pulse 93    Temp 98.4 °F (36.9 °C) (Oral)    Resp 18    Ht 6' 2\" (1.88 m)    Wt 228 lb 9.6 oz (103.7 kg)    SpO2 98%    BMI 29.35 kg/m2       Physical Exam   Constitutional: He is oriented to person, place, and time. He appears well-developed and well-nourished. No distress. HENT:   Right Ear: External ear normal.   Left Ear: External ear normal.   Eyes: Conjunctivae and EOM are normal. Right eye exhibits no discharge. Left eye exhibits no discharge. Neck: Normal range of motion. Neck supple. Cardiovascular: Normal rate and regular rhythm. Pulses:       Dorsalis pedis pulses are 2+ on the right side, and 2+ on the left side. Pulmonary/Chest: Effort normal and breath sounds normal. He has no wheezes. Abdominal: Soft. Bowel sounds are normal. There is no tenderness. Lymphadenopathy:     He has no cervical adenopathy. Neurological: He is alert and oriented to person, place, and time. Reflex Scores:       Patellar reflexes are 2+ on the right side and 2+ on the left side. Skin: He is not diaphoretic. Psychiatric: He has a normal mood and affect. His behavior is normal.   Nursing note and vitals reviewed. ASSESSMENT and PLAN    ICD-10-CM ICD-9-CM    1. Physical exam Z00.00 V70.9 Complete physical exam done. Basic labs reviewed with pt.   2. Essential hypertension I10 401.9 BP is well-controlled on current medication. No change to dosage at this time. 3. Lymphoma, follicular, small and large cleaved-cell (HCC) C82.80 202.00 He is followed by a lymphoma specialist at [facility], Dr. Pretty Kemp. This plan was reviewed with the patient and patient agrees. All questions were answered.     This scribe documentation was reviewed by me and accurately reflects the examination and decisions made by me.

## 2017-10-31 NOTE — PROGRESS NOTES
Chief Complaint   Patient presents with    Complete Physical     patient has a form to be filled out. 1. Have you been to the ER, urgent care clinic since your last visit? Hospitalized since your last visit? No    2. Have you seen or consulted any other health care providers outside of the [address] [place] [address] since your last visit? Include any pap smears or colon screening. Yes with  for lymphedema on 10/10/17

## 2017-11-09 ENCOUNTER — OFFICE VISIT (OUTPATIENT)
Dept: INTERNAL MEDICINE CLINIC | Age: 57
End: 2017-11-09

## 2017-11-09 VITALS
WEIGHT: 233.4 LBS | RESPIRATION RATE: 17 BRPM | HEART RATE: 90 BPM | SYSTOLIC BLOOD PRESSURE: 116 MMHG | DIASTOLIC BLOOD PRESSURE: 82 MMHG | HEIGHT: 74 IN | TEMPERATURE: 97.3 F | BODY MASS INDEX: 29.95 KG/M2 | OXYGEN SATURATION: 98 %

## 2017-11-09 DIAGNOSIS — J01.10 ACUTE NON-RECURRENT FRONTAL SINUSITIS: ICD-10-CM

## 2017-11-09 DIAGNOSIS — H66.93 ACUTE EAR INFECTION, BILATERAL: Primary | ICD-10-CM

## 2017-11-09 RX ORDER — MOMETASONE FUROATE 50 UG/1
2 SPRAY, METERED NASAL DAILY
Qty: 1 CONTAINER | Refills: 0 | Status: SHIPPED | OUTPATIENT
Start: 2017-11-09 | End: 2017-11-19

## 2017-11-09 RX ORDER — AMOXICILLIN AND CLAVULANATE POTASSIUM 875; 125 MG/1; MG/1
1 TABLET, FILM COATED ORAL EVERY 12 HOURS
Qty: 14 TAB | Refills: 0 | Status: SHIPPED | OUTPATIENT
Start: 2017-11-09 | End: 2017-11-16

## 2017-11-09 RX ORDER — CIPROFLOXACIN AND DEXAMETHASONE 3; 1 MG/ML; MG/ML
4 SUSPENSION/ DROPS AURICULAR (OTIC) 2 TIMES DAILY
Qty: 7.5 ML | Refills: 0 | Status: SHIPPED | OUTPATIENT
Start: 2017-11-09 | End: 2017-11-16

## 2017-11-09 NOTE — MR AVS SNAPSHOT
Visit Information Date & Time Provider Department Dept. Phone Encounter #  
 11/9/2017  9:30 AM Zaynab Beltrán, [address] [facility] Internal Medicine [phone] [id number] Upcoming Health Maintenance Date Due Hepatitis C Screening 1960 Pneumococcal 19-64 Highest Risk (2 of 3 - PCV13) 1/1/2008 DTaP/Tdap/Td series (1 - Tdap) 11/26/2016 COLONOSCOPY 10/15/2022 Allergies as of 11/9/2017  Review Complete On: 11/9/2017 By: Suze Daigle LPN Severity Noted Reaction Type Reactions Lactose High 07/15/2013   Intolerance Nausea and Vomiting Iodinated Contrast- Oral And Iv Dye Medium 08/18/2015    Hives Current Immunizations  Reviewed on 9/22/2016 Name Date H1N1 FLU VACCINE 1/19/2010 Influenza Vaccine 10/10/2017, 12/28/2015, 10/26/2014 Pneumococcal Vaccine (Unspecified Type) 1/1/2007 Td, Adsorbed PF 11/25/2016  5:25 PM  
  
 Not reviewed this visit You Were Diagnosed With   
  
 Codes Comments Acute ear infection, bilateral    -  Primary ICD-10-CM: H66.93 
ICD-9-CM: 382. 9 Acute non-recurrent frontal sinusitis     ICD-10-CM: J01.10 ICD-9-CM: 587.9 Vitals BP Pulse Temp Resp Height(growth percentile) Weight(growth percentile) 116/82 (BP 1 Location: Left arm, BP Patient Position: Sitting) 90 97.3 °F (36.3 °C) (Oral) 17 6' 2\" (1.88 m) 233 lb 6.4 oz (105.9 kg) SpO2 BMI Smoking Status 98% 29.97 kg/m2 Former Smoker BMI and BSA Data Body Mass Index Body Surface Area  
 29.97 kg/m 2 2.35 m 2 Preferred Pharmacy Pharmacy Name Phone [address], [facility] [address] [phone] Your Updated Medication List  
  
   
This list is accurate as of: 11/9/17 10:47 AM.  Always use your most recent med list.  
  
  
  
  
 amoxicillin-clavulanate 875-125 mg per tablet Commonly known as:  AUGMENTIN  
 Take 1 Tab by mouth every twelve (12) hours for 7 days. aspirin delayed-release 81 mg tablet Take  by mouth daily. ciprofloxacin-dexamethasone 0.3-0.1 % otic suspension Commonly known as:  Humza Nails Administer 4 Drops in left ear two (2) times a day for 7 days. lisinopril-hydroCHLOROthiazide 20-12.5 mg per tablet Commonly known as:  PRINZIDE, ZESTORETIC  
TAKE 1 TABLET BY MOUTH DAILY  
  
 mometasone 50 mcg/actuation nasal spray Commonly known as:  NASONEX  
2 Sprays by Both Nostrils route daily for 10 days. Prescriptions Printed Refills  
 ciprofloxacin-dexamethasone (CIPRODEX) 0.3-0.1 % otic suspension 0 Sig: Administer 4 Drops in left ear two (2) times a day for 7 days. Class: Print Route: Left Ear  
  
Prescriptions Sent to Pharmacy Refills  
 amoxicillin-clavulanate (AUGMENTIN) 875-125 mg per tablet 0 Sig: Take 1 Tab by mouth every twelve (12) hours for 7 days. Class: Normal  
 Pharmacy: [facility] [address] [place] [address] AT [place] [address] Ph #: [phone] Route: Oral  
 mometasone (NASONEX) 50 mcg/actuation nasal spray 0 Si Sprays by Both Nostrils route daily for 10 days. Class: Normal  
 Pharmacy: [facility] [address] [place] [address] AT [place] [address] Ph #: [phone] Route: Both Nostrils [facility]! Dear Carolina Marcus: Thank you for requesting a [employer] account. Our records indicate that you already have an active [employer] account. You can access your account anytime at [email or web address] Did you know that you can access your hospital and ER discharge instructions at any time in [employer]? You can also review all of your test results from your hospital stay or ER visit. Additional Information If you have questions, please visit the Frequently Asked Questions section of the [email or web address] website at [email or web address] Remember, [email or web address] is NOT to be used for urgent needs. For medical emergencies, dial 911. Now available from your iPhone and Android! Please provide this summary of care documentation to your next provider. Your primary care clinician is listed as Eder Sosa. If you have any questions after today's visit, please call [phone].

## 2017-11-09 NOTE — PROGRESS NOTES
Patient is here to be seen with right ear pain, he states that he can hear popping he also states that he currently has a cold that he has had for a few days and states that it became worse yesterday. Chief Complaint   Patient presents with    Ear Pain     both ears      Visit Vitals    /82 (BP 1 Location: Left arm, BP Patient Position: Sitting)    Pulse 90    Temp 97.3 °F (36.3 °C) (Oral)    Resp 17    Ht 6' 2\" (1.88 m)    Wt 233 lb 6.4 oz (105.9 kg)    SpO2 98%    BMI 29.97 kg/m2     1. Have you been to the ER, urgent care clinic since your last visit? Hospitalized since your last visit? No     2. Have you seen or consulted any other health care providers outside of the [address] [place] [address] since your last visit? Include any pap smears or colon screening.  No

## 2017-11-14 ENCOUNTER — TELEPHONE (OUTPATIENT)
Dept: ONCOLOGY | Age: 57
End: 2017-11-14

## 2017-11-14 NOTE — TELEPHONE ENCOUNTER
Patient left a voicemail stating he would like a return call from [facility] to discuss getting his treatment set up.  # [phone]

## 2017-11-14 NOTE — TELEPHONE ENCOUNTER
Called the patient and left a message to call Dr. Prasad Magana office back tomorrow 11/15/17 between 8:00 am and 5:00 pm.  Called to inform the patient that this office received Dr. Emilia White' note from [facility] and wanted to see if Monday 11/27/17 or the first week of December would be more convenient to start treatment per Dr. Shaista Correia.

## 2017-11-16 NOTE — TELEPHONE ENCOUNTER
Received a call from [address] [place] [address] at [facility] with Dr. Rain Zuniga' office and the call was transferred to Dr. Candy Diane. Per Dr. Candy Diane the patient is post transplant and that his recommendation was to start the patient on Obinutuzumab and that the patient traditionally has been CD-20 positive but he was reviewing the patient's most recent biopsy and the report showed that the patient is CD-20 negative and Dr. Candy Diane is not sure if the patient is \"low level\" and it is not \"picking it up\" or if the patient is negative but Dr. Candy Diane may not recommend Obinutuzumab and that he is going to call the patient and explain this new development and that he may recommend just rescanning the patient for any changes. Dr. Candy Diane also requested that Dr. Dana Lafleur call him tomorrow 11/17/17 so that they can discuss the plan of care and that paging Dr. Rain Zuniga with hem/onc at [phone] is the best way to contact him because his cell phone does not work in every part of the hospital and that he will also send Dr. Dana Lafleur a follow up note. Informed Dr. Candy Diane that Dr. Dana Lafleur will be made aware of the possible change in the plan of care and the request to call Dr. Candy Diane. Dr. Candy Diane verbalized understanding and denied any further questions or concerns.

## 2017-11-16 NOTE — TELEPHONE ENCOUNTER
Called the patient and left a message that Dr. Tammy Forrester is out of the office and that the patient's questions and concerns will be brought to Dr. Tammy Forrester once he is back in the office and the orders will be clarified but it appears the treatment schedule will be day one, day two, day eight, day nine, day twenty nine and then weekly and to call Dr. Romayne Mosses office with any further questions or concerns. 11/16/17 at 12:23 pm - A [employer] message was sent to the patient as well.

## 2017-11-16 NOTE — TELEPHONE ENCOUNTER
Called the patient and verified ID x 2. Deeply apologized to the patient that this office has not been able to contact the patient. Informed the patient that Dr. Roscoe Bates received Dr. Peggy Sullivan note from [facility] and that per Dr. Roscoe Bates the patient will be receiving Obinutuzumab and that treatment can start on 11/27/17 or the first week of December. The patient requested that he start on 11/27/17. Informed the patient that this office will coordinate his [facility] appointment and office appointment to see Dr. Roscoe Bates on 11/27/17 and that he will see [facility] first and have labs drawn and then will come upstairs to see Dr. Roscoe Bates to review labs and address any questions or concerns and then the patient will go back to [facility] for treatment and that this office will call the patient with his appointments once they are scheduled. The patient verbalized understanding and denied any further questions or concerns.

## 2017-11-16 NOTE — TELEPHONE ENCOUNTER
Pt called stating he spoke with nurse Emiliano Man today but has additional questions about how often he is to get the treatment because he is getting conflicting information.  Call back number [phone]

## 2017-11-17 NOTE — TELEPHONE ENCOUNTER
Dr. Jacobo Zapata is aware of the possible change in the patient's care plan and Dr. Mono Hoyos was paged on 11/17/17 at 9:12 am.

## 2017-11-22 DIAGNOSIS — C82.80: Primary | ICD-10-CM

## 2017-11-28 ENCOUNTER — HOSPITAL ENCOUNTER (OUTPATIENT)
Dept: INFUSION THERAPY | Age: 57
End: 2017-11-28

## 2017-11-28 DIAGNOSIS — F41.0 ANXIETY ATTACK: Primary | ICD-10-CM

## 2017-11-28 RX ORDER — ALPRAZOLAM 0.5 MG/1
0.5 TABLET ORAL
Qty: 15 TAB | Refills: 0 | Status: SHIPPED | OUTPATIENT
Start: 2017-11-28 | End: 2022-09-29

## 2017-12-19 DIAGNOSIS — I10 UNCONTROLLED HYPERTENSION: ICD-10-CM

## 2017-12-19 RX ORDER — LISINOPRIL AND HYDROCHLOROTHIAZIDE 12.5; 2 MG/1; MG/1
TABLET ORAL
Qty: 90 TAB | Refills: 0 | Status: SHIPPED | OUTPATIENT
Start: 2017-12-19 | End: 2018-01-15 | Stop reason: ALTCHOICE

## 2017-12-22 ENCOUNTER — HOSPITAL ENCOUNTER (OUTPATIENT)
Dept: PET IMAGING | Age: 57
Discharge: HOME | End: 2017-12-22
Attending: INTERNAL MEDICINE
Payer: COMMERCIAL

## 2017-12-22 VITALS — BODY MASS INDEX: 28.88 KG/M2 | WEIGHT: 225 LBS | HEIGHT: 74 IN

## 2017-12-22 DIAGNOSIS — C82.80: ICD-10-CM

## 2017-12-22 PROCEDURE — A9552 F18 FDG: HCPCS

## 2017-12-22 RX ORDER — SODIUM CHLORIDE 0.9 % (FLUSH) 0.9 %
10 SYRINGE (ML) INJECTION
Status: COMPLETED | OUTPATIENT
Start: 2017-12-22 | End: 2017-12-22

## 2017-12-22 RX ADMIN — Medication 10 ML: at 08:16

## 2018-01-02 ENCOUNTER — TELEPHONE (OUTPATIENT)
Dept: ONCOLOGY | Age: 58
End: 2018-01-02

## 2018-01-02 NOTE — TELEPHONE ENCOUNTER
Called the patient and left a message to call Dr. Elie Camara office back at his earliest convenience tomorrow 1/3/18 between 8:00 am and 5:00 pm.

## 2018-01-02 NOTE — TELEPHONE ENCOUNTER
Pt called and left a voicemail requesting results from his scans on 12/22.  Call back number [phone]

## 2018-01-02 NOTE — TELEPHONE ENCOUNTER
Called the patient and left a message to call Dr. Letty Norris office back at his earliest convenience. Called to inform the patient that per Dr. Mary Cast the left adrenal mass is minimally larger from 1.9 cm to 2.3 cm but the scans were stable, that they will continue to monitor, and that he recommends scans again in three months.

## 2018-01-03 NOTE — TELEPHONE ENCOUNTER
Called the patient and left a message per the patient's request that per Dr. Kevin Arreguin his adrenal mass is minimally larger from 1.9 to 2.3 cm but the scans are stable and Dr. Kevin Arreguin recommends that he continue to monitor with scans in three months and that the results will be faxed to Dr. Miguel Angel Velasquez at [facility] and to call Dr. Amanda Rockwell office if he has any questions or concerns.

## 2018-01-03 NOTE — TELEPHONE ENCOUNTER
Called Dr. Lara Centeno' office at [facility] at [phone] and verified the fax number [phone] for his office to send the records. 1/3/18 at 12:18 pm - PET scan report faxed to Dr. Lara Centeno at [phone] and a fax confirmation was received.

## 2018-01-03 NOTE — TELEPHONE ENCOUNTER
Pt called and left a voicemail for the nurse. He stated he would like the nurse to return his call with the results and if he is not able to answer to please leave the results on his voicemail. He also requests the results to be sent to Dr. Christine Box at [facility].  Call back number [phone]

## 2018-01-15 ENCOUNTER — OFFICE VISIT (OUTPATIENT)
Dept: INTERNAL MEDICINE CLINIC | Age: 58
End: 2018-01-15

## 2018-01-15 VITALS
HEIGHT: 74 IN | WEIGHT: 230 LBS | HEART RATE: 69 BPM | BODY MASS INDEX: 29.52 KG/M2 | TEMPERATURE: 98.5 F | DIASTOLIC BLOOD PRESSURE: 78 MMHG | SYSTOLIC BLOOD PRESSURE: 122 MMHG | OXYGEN SATURATION: 98 % | RESPIRATION RATE: 20 BRPM

## 2018-01-15 DIAGNOSIS — R06.2 WHEEZING: ICD-10-CM

## 2018-01-15 DIAGNOSIS — J40 BRONCHITIS: Primary | ICD-10-CM

## 2018-01-15 DIAGNOSIS — I10 ESSENTIAL HYPERTENSION: ICD-10-CM

## 2018-01-15 RX ORDER — BENZONATATE 200 MG/1
200 CAPSULE ORAL
Qty: 30 CAP | Refills: 0 | Status: SHIPPED | OUTPATIENT
Start: 2018-01-15 | End: 2018-01-22

## 2018-01-15 RX ORDER — DOXYCYCLINE 100 MG/1
100 TABLET ORAL 2 TIMES DAILY
Qty: 20 TAB | Refills: 0 | Status: SHIPPED | OUTPATIENT
Start: 2018-01-15 | End: 2018-11-19 | Stop reason: ALTCHOICE

## 2018-01-15 RX ORDER — METHYLPREDNISOLONE 4 MG/1
TABLET ORAL
Qty: 1 DOSE PACK | Refills: 0 | Status: SHIPPED | OUTPATIENT
Start: 2018-01-15 | End: 2018-11-19 | Stop reason: ALTCHOICE

## 2018-01-15 RX ORDER — ALBUTEROL SULFATE 90 UG/1
1 AEROSOL, METERED RESPIRATORY (INHALATION)
Qty: 1 INHALER | Refills: 0 | Status: SHIPPED | OUTPATIENT
Start: 2018-01-15 | End: 2018-10-29 | Stop reason: SDUPTHER

## 2018-01-15 NOTE — PROGRESS NOTES
This note will not be viewable in [address] Renee Monroe is a  56 y.o. male presents for visit. Cough    Chief Complaint   Patient presents with    Cold Symptoms     has been having congested for 3 weeks, stated has prod cough with green sputum. chest congstion as well as post nasal drip, has felt weak over the past few weeks get short of breath often even without actrivity. HPI  Patient reports cough started about 3 weeks ago and are getting progressively worse. Reports large amounts purulent nasal discharge, SOB, and cough. Taking mucinex which has been somewhat effective. Review of Systems   Constitutional: Positive for chills. HENT: Positive for congestion and ear pain (popping). Respiratory: Positive for cough, sputum production and wheezing. Cardiovascular: Negative for chest pain and palpitations. Visit Vitals    /78    Pulse 69    Temp 98.5 °F (36.9 °C) (Oral)    Resp 20    Ht 6' 2\" (1.88 m)    Wt 230 lb (104.3 kg)    SpO2 98%    BMI 29.53 kg/m2     Physical Exam   Constitutional: No distress. HENT:   Head: Normocephalic and atraumatic. Right Ear: Tympanic membrane is not erythematous. No middle ear effusion. Left Ear: Tympanic membrane is not erythematous. No middle ear effusion. Nose: Rhinorrhea (purulent) present. No mucosal edema. Right sinus exhibits no maxillary sinus tenderness and no frontal sinus tenderness. Left sinus exhibits no maxillary sinus tenderness and no frontal sinus tenderness. Mouth/Throat: Uvula is midline and mucous membranes are normal. No oropharyngeal exudate or posterior oropharyngeal erythema. Eyes: Conjunctivae are normal.   Cardiovascular: Regular rhythm and normal heart sounds. No murmur heard. Pulmonary/Chest: Effort normal. No accessory muscle usage. He has wheezes (diffuse). He has no rales. Lymphadenopathy:     He has no cervical adenopathy.              No results found for this or any previous visit (from the past 24 hour(s)). Patient Active Problem List    Diagnosis Date Noted    Chemotherapy follow-up examination     Lymphoma, follicular, small and large cleaved-cell ([facility] [address] 12/15/2014    Gout 10/24/2011    HTN (hypertension) 12/28/2009         ASSESSMENT AND PLAN:      ICD-10-CM ICD-9-CM   1. Bronchitis J40 490   2. Wheezing R06.2 786.07   3. Essential hypertension I10 401.9     Orders Placed This Encounter    methylPREDNISolone (MEDROL DOSEPACK) 4 mg tablet     Sig: Take as directed     Dispense:  1 Dose Pack     Refill:  0    albuterol (PROVENTIL HFA, VENTOLIN HFA, PROAIR HFA) 90 mcg/actuation inhaler     Sig: Take 1 Puff by inhalation every four (4) hours as needed for Wheezing. Dispense:  1 Inhaler     Refill:  0    doxycycline (ADOXA) 100 mg tablet     Sig: Take 1 Tab by mouth two (2) times a day. Dispense:  20 Tab     Refill:  0    benzonatate (TESSALON) 200 mg capsule     Sig: Take 1 Cap by mouth three (3) times daily as needed for Cough for up to 7 days. Indications: Cough     Dispense:  30 Cap     Refill:  0     Diagnoses and all orders for this visit:    1. Bronchitis  -     methylPREDNISolone (MEDROL DOSEPACK) 4 mg tablet; Take as directed  -     albuterol (PROVENTIL HFA, VENTOLIN HFA, PROAIR HFA) 90 mcg/actuation inhaler; Take 1 Puff by inhalation every four (4) hours as needed for Wheezing.  -     doxycycline (ADOXA) 100 mg tablet; Take 1 Tab by mouth two (2) times a day. -     benzonatate (TESSALON) 200 mg capsule; Take 1 Cap by mouth three (3) times daily as needed for Cough for up to 7 days. Indications: Cough    2. Wheezing  -     methylPREDNISolone (MEDROL DOSEPACK) 4 mg tablet; Take as directed  -     albuterol (PROVENTIL HFA, VENTOLIN HFA, PROAIR HFA) 90 mcg/actuation inhaler; Take 1 Puff by inhalation every four (4) hours as needed for Wheezing. 3. Essential hypertension-Continue current treatment.     Advised patient to contact office in 3 days if no improvement with current treatment. Will use alternate antibiotic if indicated. Follow-up Disposition:  Return if symptoms worsen or fail to improve. Disclaimer:  Advised him to call back or return to office if symptoms worsen/change/persist.  Discussed expected course/resolution/complications of diagnosis in detail with patient. Medication risks/benefits/alternatives discussed with patient. He was given an after visit summary which includes diagnoses, current medications, & vitals. Discussed patient instructions and advised to read to all patient instructions regarding care. He expressed understanding with the diagnosis and plan.

## 2018-03-22 DIAGNOSIS — I10 UNCONTROLLED HYPERTENSION: ICD-10-CM

## 2018-03-22 RX ORDER — LISINOPRIL AND HYDROCHLOROTHIAZIDE 12.5; 2 MG/1; MG/1
TABLET ORAL
Qty: 90 TAB | Refills: 0 | Status: SHIPPED | OUTPATIENT
Start: 2018-03-22 | End: 2018-07-16 | Stop reason: SDUPTHER

## 2018-03-29 DIAGNOSIS — Z20.828 EXPOSURE TO THE FLU: Primary | ICD-10-CM

## 2018-03-29 RX ORDER — OSELTAMIVIR PHOSPHATE 75 MG/1
75 CAPSULE ORAL DAILY
Qty: 10 CAP | Refills: 0 | Status: SHIPPED | OUTPATIENT
Start: 2018-03-29 | End: 2018-04-08

## 2018-04-30 ENCOUNTER — TELEPHONE (OUTPATIENT)
Dept: ONCOLOGY | Age: 58
End: 2018-04-30

## 2018-04-30 DIAGNOSIS — C82.80: Primary | ICD-10-CM

## 2018-04-30 RX ORDER — PREDNISONE 50 MG/1
TABLET ORAL
Qty: 3 TAB | Refills: 0 | Status: SHIPPED | OUTPATIENT
Start: 2018-04-30 | End: 2018-08-27 | Stop reason: ALTCHOICE

## 2018-04-30 NOTE — TELEPHONE ENCOUNTER
Called the patient and verified ID x 2. Informed the patient that a prescription for Prednisone was sent to his listed [facility] and an order for a PET/CT scan was also placed and to expect a call from the schedulers. The patient verbalized understanding and denied any further questions or concerns.

## 2018-05-08 ENCOUNTER — HOSPITAL ENCOUNTER (OUTPATIENT)
Dept: PET IMAGING | Age: 58
Discharge: HOME | End: 2018-05-08
Attending: INTERNAL MEDICINE
Payer: COMMERCIAL

## 2018-05-08 VITALS — BODY MASS INDEX: 28.88 KG/M2 | WEIGHT: 225 LBS | HEIGHT: 74 IN

## 2018-05-08 DIAGNOSIS — C82.80: ICD-10-CM

## 2018-05-08 PROCEDURE — 78815 PET IMAGE W/CT SKULL-THIGH: CPT

## 2018-05-08 RX ORDER — SODIUM CHLORIDE 0.9 % (FLUSH) 0.9 %
10 SYRINGE (ML) INJECTION
Status: COMPLETED | OUTPATIENT
Start: 2018-05-08 | End: 2018-05-08

## 2018-05-08 RX ADMIN — Medication 10 ML: at 07:55

## 2018-07-16 DIAGNOSIS — I10 UNCONTROLLED HYPERTENSION: ICD-10-CM

## 2018-07-18 RX ORDER — LISINOPRIL AND HYDROCHLOROTHIAZIDE 12.5; 2 MG/1; MG/1
TABLET ORAL
Qty: 90 TAB | Refills: 0 | Status: SHIPPED | OUTPATIENT
Start: 2018-07-18 | End: 2018-11-30 | Stop reason: SDUPTHER

## 2018-08-27 DIAGNOSIS — M1A.9XX0 CHRONIC GOUT INVOLVING TOE WITHOUT TOPHUS, UNSPECIFIED CAUSE, UNSPECIFIED LATERALITY: Primary | ICD-10-CM

## 2018-08-27 RX ORDER — INDOMETHACIN 50 MG/1
50 CAPSULE ORAL
Qty: 90 CAP | Refills: 0 | Status: SHIPPED | OUTPATIENT
Start: 2018-08-27 | End: 2019-06-15 | Stop reason: SDUPTHER

## 2018-09-20 DIAGNOSIS — C82.80: ICD-10-CM

## 2018-09-20 DIAGNOSIS — Z91.041 ALLERGY TO IODINATED CONTRAST: Primary | ICD-10-CM

## 2018-09-20 RX ORDER — PREDNISONE 50 MG/1
TABLET ORAL
Qty: 3 TAB | Refills: 0 | Status: SHIPPED | OUTPATIENT
Start: 2018-09-20 | End: 2018-11-19 | Stop reason: ALTCHOICE

## 2018-10-29 ENCOUNTER — OFFICE VISIT (OUTPATIENT)
Dept: PRIMARY CARE CLINIC | Age: 58
End: 2018-10-29

## 2018-10-29 VITALS
HEIGHT: 74 IN | SYSTOLIC BLOOD PRESSURE: 131 MMHG | TEMPERATURE: 98.2 F | BODY MASS INDEX: 28.85 KG/M2 | WEIGHT: 224.8 LBS | DIASTOLIC BLOOD PRESSURE: 94 MMHG | HEART RATE: 109 BPM | OXYGEN SATURATION: 98 % | RESPIRATION RATE: 16 BRPM

## 2018-10-29 DIAGNOSIS — R06.2 WHEEZING: ICD-10-CM

## 2018-10-29 DIAGNOSIS — J22 LOWER RESP. TRACT INFECTION: Primary | ICD-10-CM

## 2018-10-29 DIAGNOSIS — I10 UNCONTROLLED HYPERTENSION: ICD-10-CM

## 2018-10-29 DIAGNOSIS — I10 ESSENTIAL HYPERTENSION: ICD-10-CM

## 2018-10-29 DIAGNOSIS — J40 BRONCHITIS: ICD-10-CM

## 2018-10-29 RX ORDER — LISINOPRIL AND HYDROCHLOROTHIAZIDE 12.5; 2 MG/1; MG/1
TABLET ORAL
Qty: 30 TAB | Refills: 0 | Status: SHIPPED | OUTPATIENT
Start: 2018-10-29 | End: 2018-11-19 | Stop reason: SDUPTHER

## 2018-10-29 RX ORDER — ALBUTEROL SULFATE 90 UG/1
1 AEROSOL, METERED RESPIRATORY (INHALATION)
Qty: 1 INHALER | Refills: 0 | Status: SHIPPED | OUTPATIENT
Start: 2018-10-29 | End: 2020-04-03 | Stop reason: SDUPTHER

## 2018-10-29 RX ORDER — DOXYCYCLINE 100 MG/1
100 TABLET ORAL 2 TIMES DAILY
Qty: 20 TAB | Refills: 0 | Status: SHIPPED | OUTPATIENT
Start: 2018-10-29 | End: 2018-11-08

## 2018-10-29 NOTE — PROGRESS NOTES
Chief Complaint Patient presents with  Cold Symptoms  
  started saturday and yesterday coughing a lot of green mucus.   states that dizzy energy low and is mostly in the chest.

## 2018-10-29 NOTE — PROGRESS NOTES
HISTORY OF PRESENT ILLNESS Saad Bryant is a 66 y.o. male. HPI Chief Complaint Patient presents with  Cold Symptoms  
  started saturday and yesterday coughing a lot of green mucus. states that dizzy energy low and is mostly in the chest.   
 
Pt presents today with cold symptoms including fatigue, cough productive of green mucus, and wheezing with chest tightness that started Friday. He states the symptoms started with postnasal drip and has now progressed to his chest. Denies any chills or fevers. He states he has been coughing for the past 2-3 weeks prior to and states he is using both qvar and albuterol from a previous visit which are helping with his wheezing. He is taking theraflu with pseudoephedrine as needed. He is also taking flonase OTC for nasal congestion which is helping also. No recent sick contacts that he is aware of. Pt states his BP is normally well-controlled on lisinopril HCTZ but is elevated today because of pseudophed this morning . Patient Active Problem List  
Diagnosis Code  
 HTN (hypertension) I10  
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 tract infection J22 Current Outpatient Medications on File Prior to Visit Medication Sig Dispense Refill  predniSONE (DELTASONE) 50 mg tablet Take 1 tablet at 13, 7 and 1 hours prior to procedure. Also take 50 mg benadryl 1 hour prior to procedure. 3 Tab 0  
 indomethacin (INDOCIN) 50 mg capsule Take 1 Cap by mouth two (2) times daily as needed for up to 90 doses. 90 Cap 0  
 lisinopril-hydroCHLOROthiazide (PRINZIDE, ZESTORETIC) 20-12.5 mg per tablet TAKE 1 TABLET BY MOUTH DAILY 90 Tab 0  
 methylPREDNISolone (MEDROL DOSEPACK) 4 mg tablet Take as directed 1 Dose Pack 0  
 doxycycline (ADOXA) 100 mg tablet Take 1 Tab by mouth two (2) times a day.  20 Tab 0  
  ALPRAZolam (XANAX) 0.5 mg tablet Take 1 Tab by mouth nightly as needed for Anxiety for up to 15 doses. Max Daily Amount: 0.5 mg. 15 Tab 0  
 aspirin delayed-release 81 mg tablet Take  by mouth daily. No current facility-administered medications on file prior to visit. Allergies Allergen Reactions  Lactose Nausea and Vomiting  Iodinated Contrast- Oral And Iv Dye Hives Past Medical History:  
Diagnosis Date  [facility] NHL  Chemotherapy follow-up examination NIKO 9-25-15= Exercise stress echo is normal. Walked 9 minutes, normal EF  
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  
Procedure Laterality Date  HX HEENT    
 lymph node resection-midline,neck  HX TRANSPLANT  10/2013  
 bone marrow transplant Family History Problem Relation Age of Onset  Lung Disease Mother COPD  Heart Disease Father   
     polymyalgia rheumatica  Hypertension Brother  Cancer Brother   
     testicular cancer Social History Socioeconomic History  Marital status:  Spouse name: Not on file  Number of children: Not on file  Years of education: Not on file  Highest education level: Not on file Social Needs  Financial resource strain: Not on file  Food insecurity - worry: Not on file  Food insecurity - inability: Not on file  Transportation needs - medical: Not on file  Transportation needs - non-medical: Not on file Occupational History  Not on file Tobacco Use  Smoking status: Former Smoker Packs/day: 1.00 Years: 7.00 Pack years: 7.00 Last attempt to quit: 1999 Years since quittin.1  Smokeless tobacco: Never Used Substance and Sexual Activity  Alcohol use: Yes   Alcohol/week: 3.5 oz  
 Types: 7 Standard drinks or equivalent per week  Drug use: No  
 Sexual activity: Yes  
  Partners: Female Other Topics Concern  Not on file Social History Narrative Works in [employer] at [employer]. Review of Systems Constitutional: Positive for malaise/fatigue. Negative for chills and fever. HENT: Positive for congestion. Eyes: Negative for discharge. Respiratory: Positive for cough, sputum production and wheezing. Negative for shortness of breath. Cardiovascular: Negative for chest pain and palpitations. Gastrointestinal: Negative for constipation, diarrhea, nausea and vomiting. Neurological: Positive for weakness. Negative for dizziness and headaches. Physical Exam  
Constitutional: He appears well-developed and well-nourished. Cardiovascular: Normal rate and regular rhythm. tachycardia Pulmonary/Chest: Effort normal. He has wheezes. Musculoskeletal: Normal range of motion. Neurological: He is alert. Skin: Skin is warm and dry. Psychiatric: He has a normal mood and affect. Visit Vitals BP (!) 131/94 (BP 1 Location: Right arm, BP Patient Position: Sitting) Pulse (!) 109 Temp 98.2 °F (36.8 °C) (Oral) Resp 16 Ht 6' 2\" (1.88 m) Wt 224 lb 12.8 oz (102 kg) SpO2 98% BMI 28.86 kg/m² ASSESSMENT and PLAN 
  ICD-10-CM ICD-9-CM 1. Lower resp. tract infection J22 519.8 doxycycline (ADOXA) 100 mg tablet Take as directed. Pt instructed to get flu immunization when feeling better. 2. Essential hypertension I10 401.9 lisinopril-hydroCHLOROthiazide (PRINZIDE, ZESTORETIC) 20-12.5 mg per tablet Renewed med for 1 month and advised pt to make f/u appointment for HTN as he states he has not seen us for 1 year. I advised him to avoid pseudophed containing cold meds with his elevated BP and pulse today.   
  
3. Bronchitis J40 490  Continue with both inhalers below, refills sent to pharmacy. QVAR for next 1-2 weeks and albuterol as \"rescue\" inhaler when he feels his chest is tight. Pt has tessalon pearls at home he is using PRN does not want refill. 4. Wheezing R06.2 786.07 albuterol (PROVENTIL HFA, VENTOLIN HFA, PROAIR HFA) 90 mcg/actuation inhaler  
   beclomethasone (QVAR) 80 mcg/actuation aero This note will not be viewable in [address] Disclaimer: 
Advised patient to call back or return to office if symptoms worsen/change/persist. 
Discussed expected course/resolution/complications of diagnosis in detail with patient. 
  
Medication risks/benefits/alternatives discussed with patient. Patient was given an after visit summary which includes diagnoses, current medications, & vitals.  
  
Discussed patient instructions and advised to read to all patient instructions regarding care.  
  
Patient expressed understanding with the diagnosis and plan.

## 2018-10-30 ENCOUNTER — HOSPITAL ENCOUNTER (OUTPATIENT)
Dept: PET IMAGING | Age: 58
Discharge: HOME | End: 2018-10-30
Attending: NURSE PRACTITIONER
Payer: COMMERCIAL

## 2018-10-30 VITALS — BODY MASS INDEX: 28.23 KG/M2 | WEIGHT: 220 LBS | HEIGHT: 74 IN

## 2018-10-30 DIAGNOSIS — C82.80: ICD-10-CM

## 2018-10-30 PROCEDURE — 78815 PET IMAGE W/CT SKULL-THIGH: CPT

## 2018-10-30 RX ORDER — SODIUM CHLORIDE 0.9 % (FLUSH) 0.9 %
10 SYRINGE (ML) INJECTION
Status: DISPENSED | OUTPATIENT
Start: 2018-10-30 | End: 2018-10-30

## 2018-11-09 ENCOUNTER — DOCUMENTATION ONLY (OUTPATIENT)
Dept: PRIMARY CARE CLINIC | Age: 58
End: 2018-11-09

## 2018-11-09 ENCOUNTER — TELEPHONE (OUTPATIENT)
Dept: PRIMARY CARE CLINIC | Age: 58
End: 2018-11-09

## 2018-11-09 DIAGNOSIS — I10 UNCONTROLLED HYPERTENSION: ICD-10-CM

## 2018-11-09 DIAGNOSIS — I10 ESSENTIAL HYPERTENSION: ICD-10-CM

## 2018-11-09 RX ORDER — LISINOPRIL AND HYDROCHLOROTHIAZIDE 12.5; 2 MG/1; MG/1
TABLET ORAL
Qty: 90 TAB | Refills: 0 | Status: CANCELLED | OUTPATIENT
Start: 2018-11-09

## 2018-11-09 NOTE — TELEPHONE ENCOUNTER
Call to Patient:   Patient needs a 90 day Rx for the lisinopril, as he is getting ready to not have insurance and he wants to make sure he doesn't run out. I told him insurance may not approve since he just had a refill recently. Call from Patient's Wife re: Janelle Champion  Patient's wife called to say that the QVAR inhaler will not be covered by their health insurance and would you please send in something else to the pharmacy?

## 2018-11-09 NOTE — TELEPHONE ENCOUNTER
Spoke with him, he will call us when runs out of insurance. Will send his prescriptions to the [facility] DR KRYSTIN PIZARRO.

## 2018-11-09 NOTE — PROGRESS NOTES
Re: Leydi Ambrosio (Key: [id number])   Qvar RediHaler 80MCG/ACT IN AERB   Form  [employer] Electronic Prior Authorization Form   Created   21 minutes ago   Sent to Plan   3 minutes ago   Plan Response   2 minutes ago   Submit Clinical Questions   Expires 2 days from now   Determination   Message from [address] [place] [address] Dr find matching patient     Spoke with patient by phone after confirming patient identifiers and informed him that the insurance continues to state that they do not have a matching member for the insurance information submitted. Patient advised that he should call the insurance company to see what is going on. Patient states that he will do so, additionally, he is requesting another 90d Rx for Lisinopril as he will be without/changing insurance again and wants to make sure he has enough meds, until his next insurance begins.

## 2018-11-19 ENCOUNTER — OFFICE VISIT (OUTPATIENT)
Dept: ONCOLOGY | Age: 58
End: 2018-11-19

## 2018-11-19 VITALS
BODY MASS INDEX: 28.95 KG/M2 | HEIGHT: 74 IN | DIASTOLIC BLOOD PRESSURE: 92 MMHG | TEMPERATURE: 97.6 F | HEART RATE: 87 BPM | WEIGHT: 225.6 LBS | OXYGEN SATURATION: 98 % | RESPIRATION RATE: 18 BRPM | SYSTOLIC BLOOD PRESSURE: 138 MMHG

## 2018-11-19 DIAGNOSIS — C82.90 FOLLICULAR LYMPHOMA, UNSPECIFIED FOLLICULAR LYMPHOMA TYPE, UNSPECIFIED BODY REGION (HCC): Primary | ICD-10-CM

## 2018-11-19 NOTE — PROGRESS NOTES
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 Diagnosed as Stage III back in 1999. TREATMENT  R-CHOP x 6 cycles in 1999. CR1  Recurrence in 2008 treated with Rituxan. From January 2009 through February 2011 in [address] [place] [address]  November 2012 evidence of recurrence in the spine on CT, MRI, and PET. Rituxan/Bendamustine started in April 2013 for 4 cycles. High dose methotrexate beginning in 7/15/2013 x 2 cycles, completed in August 2013. Autologous transplant on 10/22/13 with [facility]  Maintenance Rituxan started in July 2014 stopped July 2016 8/30/17 FNA    Left retroperitoneal mass, fine-needle aspiration:   Recurrent follicular lymphoma with focal increase in large lymphocytes. Comment   The fine-needle aspiration smears reveal numerous atypical lymphocytes ranging small to large in size with irregular nuclear outlines with deep nuclear grooves and inconspicuous to prominent nucleoli, consistent with a mixed population of centrocytes and centroblasts. No core biopsy is available. Flow cytometric analysis reveals approximately 15% monoclonal CD10 positive B-cell population with mild increase in FSC/SSC without expression of CD20. Past medical history indicates low-grade follicular lymphoma (grade 2 of 3). In summary, the combined morphologic and phenotypic findings are diagnostic of recurrent follicular lymphoma. Based on the limited morphology of this fine-needle aspiration with focal increase in large lymphocytes and hypermetabolic retroperitoneal soft tissue mass, overall features are worrisome for high-grade follicular lymphoma.  Of note, large cell transformation arising in follicular lymphoma cannot be entirely excluded based on the fine-needle aspiration procedure. Monoclonal, kappa light chain restricted B-cell population (15%) with mild increase in forward and side scatter, expressing CD19 and CD10 without expression of CD20. T-cells with mild down regulated CD7 expression without aberrant phenotype. HISTORY OF PRESENT ILLNESS: Mr. Armando Ramos is a 66 y.o. male who presents for follow-up of lymphoma as above. He has had multiple chemotherapy regimens including an autologous stem cell transplant in October of 2013. He started his care with Dr. Supriya Cartwright and has been following with Dr. Jocelynn Rooney since around 2008. Follow[facility] with [facility]. Benson Rock at [facility] as well as Dr. Ferny Sequeira at [facility]. Presents today for follow-up. Overall, has been doing well and has no complaints today. Past Medical History:   Diagnosis Date    Cancer [facility])     NHL    Chemotherapy follow-up examination     NIKO 9-25-15= Exercise stress echo is normal. Walked 9 minutes, normal EF    GERD (gastroesophageal reflux disease)     Gout 10/24/2011    Hypertension     Lymphoma, small cleaved-cell, follicular (HCC)     recurrent,currently on remission       Past Surgical History:   Procedure Laterality Date    HX HEENT      lymph node resection-midline,neck    HX TRANSPLANT  10/2013    bone marrow transplant       Allergies   Allergen Reactions    Lactose Nausea and Vomiting    Iodinated Contrast- Oral And Iv Dye Hives       Current Outpatient Medications   Medication Sig Dispense Refill    lisinopril-hydroCHLOROthiazide (PRINZIDE, ZESTORETIC) 20-12.5 mg per tablet TAKE 1 TABLET BY MOUTH DAILY 90 Tab 0    albuterol (PROVENTIL HFA, VENTOLIN HFA, PROAIR HFA) 90 mcg/actuation inhaler Take 1 Puff by inhalation every four (4) hours as needed for Wheezing. 1 Inhaler 0    indomethacin (INDOCIN) 50 mg capsule Take 1 Cap by mouth two (2) times daily as needed for up to 90 doses.  90 Cap 0    ALPRAZolam (XANAX) 0.5 mg tablet Take 1 Tab by mouth nightly as needed for Anxiety for up to 15 doses. Max Daily Amount: 0.5 mg. 15 Tab 0       Social History     Socioeconomic History    Marital status:      Spouse name: Not on file    Number of children: Not on file    Years of education: Not on file    Highest education level: Not on file   Social Needs    Financial resource strain: Not on file    Food insecurity - worry: Not on file    Food insecurity - inability: Not on file    Transportation needs - medical: Not on file   [employer] 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 Family History   Problem Relation Age of Onset    Lung Disease Mother         COPD    Heart Disease Father         polymyalgia rheumatica    Hypertension Brother     Cancer Brother         testicular cancer       ROS  As per the HPI, otherwise a comprehensive ROS is negative. ECOG PS is 0. Emotional well being addressed and patient is coping well.     Physical Examination:   Visit Vitals  BP (!) 138/92   Temp 97.6 °F (36.4 °C) (Temporal)   Resp 18   Ht 6' 2\" (1.88 m)   Wt 225 lb 9.6 oz (102.3 kg)   SpO2 98%   BMI 28.97 kg/m²     General appearance - alert, well appearing, and in no distress  Mental status - oriented to person, place, and time  Mouth - mucous membranes moist, pharynx normal without lesions  Neck - supple, no significant adenopathy  Lymphatics - no palpable lymphadenopathy, no hepatosplenomegaly  Chest - clear to auscultation, no wheezes, rales or rhonchi, symmetric air entry  Heart - normal rate, regular rhythm, normal S1, S2, no murmurs, rubs, clicks or gallops  Abdomen - soft, nontender, nondistended, no masses or organomegaly, bowel sounds present  Back exam - full range of motion, no tenderness, palpable spasm or pain on motion  Neurological - normal speech, no focal findings or movement disorder noted  Musculoskeletal - no joint tenderness, deformity or swelling  Extremities - peripheral pulses normal, no pedal edema, no clubbing or cyanosis  Skin - normal coloration and turgor, no rashes, no suspicious skin lesions noted    LABS  Lab Results   Component Value Date/Time    WBC 4.3 09/13/2017 10:05 AM    HGB 13.2 09/13/2017 10:05 AM    HCT 39.0 09/13/2017 10:05 AM    PLATELET 860 84/70/0037 10:05 AM    MCV 96.1 09/13/2017 10:05 AM    ABS. NEUTROPHILS 2.3 09/13/2017 10:05 AM     Lab Results   Component Value Date/Time    Sodium 140 12/07/2016 08:22 AM    Potassium 3.6 12/07/2016 08:22 AM    Chloride 97 12/07/2016 08:22 AM    CO2 27 12/07/2016 08:22 AM    Glucose 105 (H) 12/07/2016 08:22 AM    BUN 19 12/07/2016 08:22 AM    Creatinine 1.27 12/07/2016 08:22 AM    GFR est AA 74 12/07/2016 08:22 AM    GFR est non-AA 64 12/07/2016 08:22 AM    Calcium 9.5 12/07/2016 08:22 AM     Lab Results   Component Value Date/Time    AST (SGOT) 22 12/07/2016 08:22 AM    Alk. phosphatase 81 12/07/2016 08:22 AM    Protein, total 6.3 12/07/2016 08:22 AM    Albumin 4.4 12/07/2016 08:22 AM    Globulin 2.7 09/22/2016 02:19 PM    A-G Ratio 2.3 12/07/2016 08:22 AM       PATHOLOGY  I don't have his original pathology report. IMAGING  CT scan of his chest abdomen and pelvis on 8/19/2016 with an 11 mm soft tissue nodule just anterior to the left adrenal gland. It was previously 8 mm in size. Otherwise, the examination was unremarkable. CT chest/abdomen/pelvis on 2/22/16 with stable disease.   CT chest/abdomen/pelvis on 8/18/15 with no lymphadenopathy in the chest, abdomen, or pelvis. Severe coronary calcifications. Previously described groundglass opacities in the superior segment left lower lobe most likely related to respiratory bronchiolitis. These have resolved. CT neck,chest, abdomen, pelvis on 3/25/15 with no lymphadenopathy in the neck, chest, abdomen, or pelvis. Subtle groundglass nodularity superior segment left lower lobe may be infectious/inflammatory. DEXA scan on 10/29/14 with normal bone density. CT neck, chest, abdomen, pelvis on 10/15/14 with no evidence of active disease. 8/4/17 CT c/a/p  FINDINGS:  Lymph nodes: There is no new meli hepatis, mesenteric or retroperitoneal lymphadenopathy. Subcentimeter retroperitoneal and mesenteric lymph nodes are unchanged.     Enlarging soft tissue density nodule anterior to the left adrenal gland 3-66 16  x 18 mm in size previously 14 x 15 mm. This is minimally increased. Minimal retroperitoneal stranding on the left as well. Bones: No lytic or sclerotic osseous lesion is visualized. Miscellaneous: There is no free intraperitoneal gas or fluid. There is no focal  fluid collection to suggest abscess.     IMPRESSION:   Slight increased size of retroperitoneal soft tissue density anterior to the  left adrenal gland.     Otherwise there is no interval change. 10/30/18 PET scan:  IMPRESSION: Interval decrease in size and FDG activity of previously seen left  retroperitoneal mass. No new abnormal tracer activity in the neck, chest,  abdomen, or pelvis. ASSESSMENT  Mr. Kameron Rae is a 66 y.o. male who presents for follow-up of follicular lymphoma. DISCUSSION/PLAN  1. Follicular lymphoma, gr 2, retroperitoneal.  He completed Rituxan maintenance in July 2016. His PET scan  from 10/30/18 was reviewed in detail. There is a small soft tissue nodule versus lymph node in the left retroperitoneal region this has slightly decreased in size. Will have him return to see Dr. Lennox Doom.   Will consider repeating the PET in 6 months. He does well with premedication for contrast allergy. > 25 minutes were spent with this patient with > 50% of that time spent in face to face counseling.       Anuja Salmeron MD

## 2018-12-31 DIAGNOSIS — I10 ESSENTIAL HYPERTENSION: ICD-10-CM

## 2019-01-02 RX ORDER — LISINOPRIL AND HYDROCHLOROTHIAZIDE 12.5; 2 MG/1; MG/1
TABLET ORAL
Qty: 30 TAB | Refills: 0 | Status: SHIPPED | OUTPATIENT
Start: 2019-01-02 | End: 2019-01-30 | Stop reason: SDUPTHER

## 2019-01-30 DIAGNOSIS — I10 ESSENTIAL HYPERTENSION: ICD-10-CM

## 2019-01-30 RX ORDER — LISINOPRIL AND HYDROCHLOROTHIAZIDE 12.5; 2 MG/1; MG/1
TABLET ORAL
Qty: 30 TAB | Refills: 0 | Status: SHIPPED | OUTPATIENT
Start: 2019-01-30 | End: 2019-02-25 | Stop reason: SDUPTHER

## 2019-02-25 DIAGNOSIS — I10 ESSENTIAL HYPERTENSION: ICD-10-CM

## 2019-02-25 RX ORDER — LISINOPRIL AND HYDROCHLOROTHIAZIDE 12.5; 2 MG/1; MG/1
TABLET ORAL
Qty: 30 TAB | Refills: 0 | Status: SHIPPED | OUTPATIENT
Start: 2019-02-25 | End: 2019-04-01 | Stop reason: SDUPTHER

## 2019-03-21 ENCOUNTER — HOSPITAL ENCOUNTER (OUTPATIENT)
Dept: LAB | Age: 59
Discharge: HOME | End: 2019-03-21

## 2019-03-21 ENCOUNTER — OFFICE VISIT (OUTPATIENT)
Dept: DERMATOLOGY | Facility: AMBULATORY SURGERY CENTER | Age: 59
End: 2019-03-21

## 2019-03-21 VITALS
BODY MASS INDEX: 27.46 KG/M2 | TEMPERATURE: 99.2 F | SYSTOLIC BLOOD PRESSURE: 130 MMHG | DIASTOLIC BLOOD PRESSURE: 72 MMHG | OXYGEN SATURATION: 99 % | WEIGHT: 214 LBS | HEIGHT: 74 IN | HEART RATE: 95 BPM | RESPIRATION RATE: 16 BRPM

## 2019-03-21 DIAGNOSIS — Z85.72 HISTORY OF NON-HODGKIN'S LYMPHOMA: ICD-10-CM

## 2019-03-21 DIAGNOSIS — L82.1 SEBORRHEIC KERATOSES: ICD-10-CM

## 2019-03-21 DIAGNOSIS — D18.01 CHERRY ANGIOMA: ICD-10-CM

## 2019-03-21 DIAGNOSIS — D48.5 NEOPLASM OF UNCERTAIN BEHAVIOR OF SKIN OF TEMPORAL REGION: Primary | ICD-10-CM

## 2019-03-21 DIAGNOSIS — L81.4 LENTIGINES: ICD-10-CM

## 2019-03-21 DIAGNOSIS — D22.9 MULTIPLE BENIGN NEVI: ICD-10-CM

## 2019-03-21 RX ORDER — ASPIRIN 325 MG
81 TABLET ORAL
COMMUNITY
End: 2022-09-29

## 2019-03-21 NOTE — PATIENT INSTRUCTIONS
Chief Complaint   Patient presents with    Skin Exam     Areas of concern: lesion on Right temple     Self Skin Exam and Sunscreens    Early detection and treatment is essential in the treatment of all forms of skin cancer. If caught early, all forms of skin cancer are curable. In addition to your regular visits, you should perform a monthly skin examination. Over time, you become familiar with what is normally found on your skin and can identify new or suspicious spots. One of the screening tools you can use to assess your skin is to follow the ABCDEs:    A= Asymmetry (One half is unlike the other half)     B= Border (An irregular, scalloped or poorly defined edge)    C= Color (Is varied from one area to another, has shades of tan, brown/ black, white, red or blue)    D= Diameter (Spots larger than 6mm or a pencil eraser)    E= Evolving (New spots or one that is changing in size, shape, or color)    A follow- up interval will be customized based on your history of skin cancer or level of skin damage and risk factors. In any case, if you notice a suspicious or new spot, an appointment should be arranged between regular visits. Everyone should use sunscreen and sun-safe practices, which is especially important for those with a personal or family history of skin cancer. Suggestions for this include:    1. Use daily moisturizers containing SPF 30 or higher. 2. Wear long sleeve clothing with UPF ratings and a broad-brimmed hat. 3. Apply sunscreen with SPF 30 or higher to all sun exposed areas if you are going to be in the sun. A broad spectrum UVA/ UVB sunscreen is best.  Dont forget to REAPPLY every two hours or more often if swimming or sweating! 4. Avoid outside activities during peak sun hours, especially in the summer (10am- 2pm). 5. DO NOT use tanning beds.     Using sunscreen and sun-safe practices can help reduce the likelihood of developing skin cancer or additional skin cancers in those previously diagnosed.

## 2019-03-21 NOTE — PROGRESS NOTES
Written by Coy Whitehead, as dictated by Roseann Catalan Verneta Epley, [address]. Name: Gita Hernandez       Age: 66 y.o. Date: 3/21/2019    Chief Complaint:   Chief Complaint   Patient presents with    Skin Exam     Areas of concern: lesion on Right temple       Subjective:    HPI  Mr. Gita Hernandez is a 66 y.o. male who presents as a new patient to [facility] for a skin exam.  The patient was referred for this evaluation by his wife. The patient has not had a skin exam in the past and the patient does have current complaints related to his skin. He reports a darkly pigmented lesion on the right temporal scalp. He notes a scar on the nasal tip that is from a lesion that initially presented as a \"pimple-like\" lesion, scabbed over, and fell off. He was taking a sun-sensitive antibiotic at the time this lesion presented. He has no personal history of skin cancer, however he reports significant sun exposure though recreation (gardening, sports), however uses sunscreen pretty regularly. He reports a nevus removal on the left thigh that was benign and has returned over time. He has a history of follicular B-cell non-hodgkin's lymphoma. The patient's pertinent skin history includes: no personal history of skin cancer. ROS: Constitutional: Negative.     Dermatological : positive for - skin lesion changes    Social History     Socioeconomic History    Marital status:      Spouse name: Not on file    Number of children: Not on file    Years of education: Not on file    Highest education level: Not on file   Occupational History    Not on file   Social Needs    Financial resource strain: Not on file    Food insecurity:     Worry: Not on file     Inability: Not on file    Transportation needs:     Medical: Not on file     Non-medical: Not on file   Tobacco Use    Smoking status: Former Smoker     Packs/day: 1.00     Years: 7.00     Pack years: 7.00 Last attempt to quit: 1999     Years since quittin.5    Smokeless tobacco: Never Used   Substance and Sexual Activity    Alcohol use: Yes     Alcohol/week: 3.5 oz     Types: 7 Standard drinks or equivalent per week    Drug use: No    Sexual activity: Yes     Partners: Female   Lifestyle    Physical activity:     Days per week: Not on file     Minutes per session: Not on file    Stress: Not on file   Relationships    Social connections:     Talks on phone: Not on file     Gets together: Not on file     Attends Mandaeism service: Not on file     Active member of club or organization: Not on file     Attends meetings of clubs or organizations: Not on file     Relationship status: Not on file    Intimate partner violence:     Fear of current or ex partner: Not on file     Emotionally abused: Not on file     Physically abused: Not on file     Forced sexual activity: Not on file   Other Topics Concern    Not on file   Social History Narrative    Works in [employer] at [email or web address]. Family History   Problem Relation Age of Onset    Lung Disease Mother         COPD    Heart Disease Father         polymyalgia rheumatica    Hypertension Brother     Cancer Brother         testicular cancer       Past Medical History:   Diagnosis Date    Cancer [facility])     NHL    Chemotherapy follow-up examination     NIKO 9-25-15= Exercise stress echo is normal. Walked 9 minutes, normal EF    GERD (gastroesophageal reflux disease)     Gout 10/24/2011    Hypertension     Lymphoma, small cleaved-cell, follicular (HCC)     recurrent,currently on remission    Sun-damaged skin        Past Surgical History:   Procedure Laterality Date    HX HEENT      lymph node resection-midline,neck    HX TRANSPLANT  10/2013    bone marrow transplant       Current Outpatient Medications   Medication Sig Dispense Refill    aspirin (ASPIRIN) 325 mg tablet Take 81 mg by mouth.       lisinopril-hydroCHLOROthiazide (PRINZIDE, ZESTORETIC) 20-12.5 mg per tablet TAKE 1 TABLET BY MOUTH DAILY 30 Tab 0    albuterol (PROVENTIL HFA, VENTOLIN HFA, PROAIR HFA) 90 mcg/actuation inhaler Take 1 Puff by inhalation every four (4) hours as needed for Wheezing. 1 Inhaler 0    indomethacin (INDOCIN) 50 mg capsule Take 1 Cap by mouth two (2) times daily as needed for up to 90 doses. 90 Cap 0    ALPRAZolam (XANAX) 0.5 mg tablet Take 1 Tab by mouth nightly as needed for Anxiety for up to 15 doses. Max Daily Amount: 0.5 mg. 15 Tab 0       Allergies   Allergen Reactions    Lactose Nausea and Vomiting    Iodinated Contrast- Oral And Iv Dye Hives         Objective:    Visit Vitals  Pulse 95   Temp 99.2 °F (37.3 °C)   Resp 16   Ht 6' 2\" (1.88 m)   Wt 214 lb (97.1 kg)   SpO2 99%   BMI 27.48 kg/m²       Ben Putnam is a 66 y.o. male who appears well and in no distress. He is awake, alert, and oriented. There is no preauricular, submandibular, or cervical lymphadenopathy. A skin examination was performed including his scalp, face (including eyelid), ears, neck, chest, back, abdomen, upper extremities (including digits/nails), lower extremities, breasts, buttocks; genital skin was not examined. There is a 5 x 4 mm medium and dark brown papule on the right temporal scalp, most consistent with seborrheic keratosis. There are lentigines on sun exposed areas. He has scattered waxy macules and keratotic papules consistent with seborrheic keratoses. He has pink intradermal nevi and brown junctional nevi, no concerning features for severe atypia. He has scattered red papules consistent with cherry angiomas. Right temporal scalp    Assessment/Plan:    1. Neoplasm of Uncertain Behavior, right temporal scalp, favor SK. The differential diagnoses were discussed. A shave removal was advised to address this lesion. The procedure was reviewed and verbal and written consent were obtained.   The risks of pain, bleeding, infection, recurrence and scar were discussed. I performed the procedure. The site was cleansed and anesthetized with 1% Lidocaine with Epinephrine 1:100,000. A shave removal was performed to remove the lesion in its clinical entirety. Drysol was used for hemostasis. The wound was bandaged and care reviewed. The specimen was sent to pathology. I will contact the patient with the results and any further treatment that may be necessary. 2. Solar lentigos. The diagnosis and relationship to sun exposure was reviewed. Sun protection advised. 3. Seborrheic keratoses. The diagnosis was reviewed and the patient was reassured that no treatment is needed for these benign lesions. 4. Normal nevi. The diagnosis of normal nevi was reviewed. I discussed sun protection, sunscreen use, the warning signs of skin cancer, the need for self-skin examinations, and the need for regular practitioner exams every 1 year. The patient should follow up sooner as needed if new, changing, or symptomatic skin lesions arise. 5. Cherry angiomas. The diagnosis was reviewed and the patient was reassured that no treatment is needed for these benign lesions. This plan was reviewed with the patient and patient agrees. All questions were answered. This scribe documentation was reviewed by me and accurately reflects the examination and decisions made by me. [facility]   OFFICE PROCEDURE PROGRESS NOTE   Chart reviewed for the following:   IAntonio, have reviewed the History, Physical and updated the Allergic reactions for Lizette Urbano. TIME OUT performed immediately prior to start of procedure:   IAntonia, have performed the following reviews on Lizette Urbano   prior to the start of the procedure:     * Patient was identified by name and date of birth   * Agreement on procedure being performed was verified   * Risks and Benefits explained to the patient   * Procedure site verified and marked as necessary   * Patient was positioned for comfort   * Consent was signed and verified     Time: 3:00 PM  Date of procedure: 3/21/2019  Procedure performed by: Kirstie Queen.  Veronica Blair  Provider assisted by: Patrice Yarbrough LPN   Patient assisted by: self   How tolerated by patient: tolerated the procedure well with no complications   Comments: none

## 2019-04-01 DIAGNOSIS — I10 ESSENTIAL HYPERTENSION: ICD-10-CM

## 2019-04-01 RX ORDER — LISINOPRIL AND HYDROCHLOROTHIAZIDE 12.5; 2 MG/1; MG/1
TABLET ORAL
Qty: 30 TAB | Refills: 0 | Status: SHIPPED | OUTPATIENT
Start: 2019-04-01 | End: 2019-04-30 | Stop reason: SDUPTHER

## 2019-04-30 DIAGNOSIS — I10 ESSENTIAL HYPERTENSION: ICD-10-CM

## 2019-04-30 RX ORDER — LISINOPRIL AND HYDROCHLOROTHIAZIDE 12.5; 2 MG/1; MG/1
TABLET ORAL
Qty: 30 TAB | Refills: 0 | Status: SHIPPED | OUTPATIENT
Start: 2019-04-30 | End: 2019-05-28 | Stop reason: SDUPTHER

## 2019-05-28 DIAGNOSIS — I10 ESSENTIAL HYPERTENSION: ICD-10-CM

## 2019-05-28 RX ORDER — LISINOPRIL AND HYDROCHLOROTHIAZIDE 12.5; 2 MG/1; MG/1
TABLET ORAL
Qty: 30 TAB | Refills: 0 | Status: SHIPPED | OUTPATIENT
Start: 2019-05-28 | End: 2019-06-25 | Stop reason: SDUPTHER

## 2019-06-25 ENCOUNTER — OFFICE VISIT (OUTPATIENT)
Dept: PRIMARY CARE CLINIC | Age: 59
End: 2019-06-25

## 2019-06-25 VITALS
DIASTOLIC BLOOD PRESSURE: 87 MMHG | OXYGEN SATURATION: 100 % | WEIGHT: 219.2 LBS | TEMPERATURE: 98.2 F | HEIGHT: 74 IN | RESPIRATION RATE: 16 BRPM | SYSTOLIC BLOOD PRESSURE: 124 MMHG | BODY MASS INDEX: 28.13 KG/M2 | HEART RATE: 76 BPM

## 2019-06-25 DIAGNOSIS — Z12.5 SCREENING FOR PROSTATE CANCER: ICD-10-CM

## 2019-06-25 DIAGNOSIS — M1A.9XX0 CHRONIC GOUT INVOLVING TOE WITHOUT TOPHUS, UNSPECIFIED CAUSE, UNSPECIFIED LATERALITY: ICD-10-CM

## 2019-06-25 DIAGNOSIS — C82.80: ICD-10-CM

## 2019-06-25 DIAGNOSIS — Z00.00 ROUTINE PHYSICAL EXAMINATION: Primary | ICD-10-CM

## 2019-06-25 DIAGNOSIS — S30.861A TICK BITE OF ABDOMEN, INITIAL ENCOUNTER: ICD-10-CM

## 2019-06-25 DIAGNOSIS — Z11.59 ENCOUNTER FOR HEPATITIS C SCREENING TEST FOR LOW RISK PATIENT: ICD-10-CM
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
## 2019-06-25 DIAGNOSIS — I10 ESSENTIAL HYPERTENSION: ICD-10-CM

## 2019-06-25 PROBLEM — Z94.81 STATUS POST BONE MARROW TRANSPLANT (HCC): Status: ACTIVE | Noted: 2019-06-25

## 2019-06-25 RX ORDER — DOXYCYCLINE 100 MG/1
200 TABLET ORAL ONCE
Qty: 2 TAB | Refills: 0 | Status: SHIPPED | OUTPATIENT
Start: 2019-06-25 | End: 2019-06-25

## 2019-06-25 RX ORDER — LISINOPRIL AND HYDROCHLOROTHIAZIDE 12.5; 2 MG/1; MG/1
TABLET ORAL
Qty: 90 TAB | Refills: 1 | Status: SHIPPED | OUTPATIENT
Start: 2019-06-25 | End: 2019-12-20

## 2019-06-25 NOTE — PROGRESS NOTES
Chief Complaint   Patient presents with    Complete Physical    Insect Bite     found tick on abdomin on satureday and was there for at least 24 hours. area red and dark area in the center.   paitent is taking antihistamine

## 2019-06-25 NOTE — PATIENT INSTRUCTIONS
Body Mass Index: Care Instructions Your Care Instructions Body mass index (BMI) can help you see if your weight is raising your risk for health problems. It uses a formula to compare how much you weigh with how tall you are. · A BMI lower than 18.5 is considered underweight. · A BMI between 18.5 and 24.9 is considered healthy. · A BMI between 25 and 29.9 is considered overweight. A BMI of 30 or higher is considered obese. If your BMI is in the normal range, it means that you have a lower risk for weight-related health problems. If your BMI is in the overweight or obese range, you may be at increased risk for weight-related health problems, such as high blood pressure, heart disease, stroke, arthritis or joint pain, and diabetes. If your BMI is in the underweight range, you may be at increased risk for health problems such as fatigue, lower protection (immunity) against illness, muscle loss, bone loss, hair loss, and hormone problems. BMI is just one measure of your risk for weight-related health problems. You may be at higher risk for health problems if you are not active, you eat an unhealthy diet, or you drink too much alcohol or use tobacco products. Follow-up care is a key part of your treatment and safety. Be sure to make and go to all appointments, and call your doctor if you are having problems. It's also a good idea to know your test results and keep a list of the medicines you take. How can you care for yourself at home? · Practice healthy eating habits. This includes eating plenty of fruits, vegetables, whole grains, lean protein, and low-fat dairy. · If your doctor recommends it, get more exercise. Walking is a good choice. Bit by bit, increase the amount you walk every day. Try for at least 30 minutes on most days of the week. · Do not smoke. Smoking can increase your risk for health problems.  If you need help quitting, talk to your doctor about stop-smoking programs and medicines. These can increase your chances of quitting for good. · Limit alcohol to 2 drinks a day for men and 1 drink a day for women. Too much alcohol can cause health problems. If you have a BMI higher than 25 · Your doctor may do other tests to check your risk for weight-related health problems. This may include measuring the distance around your waist. A waist measurement of more than 40 inches in men or 35 inches in women can increase the risk of weight-related health problems. · Talk with your doctor about steps you can take to stay healthy or improve your health. You may need to make lifestyle changes to lose weight and stay healthy, such as changing your diet and getting regular exercise. If you have a BMI lower than 18.5 · Your doctor may do other tests to check your risk for health problems. · Talk with your doctor about steps you can take to stay healthy or improve your health. You may need to make lifestyle changes to gain or maintain weight and stay healthy, such as getting more healthy foods in your diet and doing exercises to build muscle. Where can you learn more? Go to http://michelle-miroslava.info/. Enter S176 in the search box to learn more about \"Body Mass Index: Care Instructions. \" Current as of: October 13, 2016 Content Version: 11.4 © 5479-9381 [employer]wise[employer]. Care instructions adapted under license by [employer] (which disclaims liability or warranty for this information). If you have questions about a medical condition or this instruction, always ask your healthcare professional. [address] any warranty or liability for your use of this information.

## 2019-06-25 NOTE — PROGRESS NOTES
[facility]   [address], [address] [facility]  P: [phone]  F: [phone]      Chief Complaint   Patient presents with    Complete Physical    Insect Bite     found tick on abdomin on satureday and was there for at least 24 hours. area red and dark area in the center. paitent is taking antihistamine       SUBJECTIVE   Dior Lucio is a 64 y.o. male who presents to clinic for Complete Physical and Insect Bite (found tick on abdomin on satureday and was there for at least 24 hours. area red and dark area in the center. mallorie is taking antihistamine). HPI:    Keshawn Wyatt is a 45-year-old male who presents today for complete physical exam with fasting labs. He has a medical history significant for hypertension, lymphoma, gout, and endorses recent tick bite. Regarding his lymphoma, he is followed by Dr. Kacey Shields at [facility] and Dr. Edith Pino at [facility]. He states that a bone marrow biopsy in 9/2017 showed no lymphoma in his bone marrow, and as a result both providers are monitoring him but not currently recommending any further treatment. Hypertension: He is compliant on lisinopril HCTZ and blood pressures are stable running 120130/ 75-85 when he checks at home. He denies any medication side effect. Tick bite: Occurred about 3 days ago, he thinks the tick was attached for about 24 hours and was not engorged when fully removed. He now has a small area of redness to his left lower abdomen where the tick was removed. He states he took Atarax last night , which was previously prescribed to him for itching. Gout: He states he has a flare about every 3 months. He currently uses indomethacin which works well for him. He is requesting to have his uric acid level checked today and is interested in taking a preventative medication if he needs to. Psych social: He is  and continues to work as the  [address] [place] [address].   He is a non-smoker and does currently drink alcohol about 1 drink daily. Patient Active Problem List    Diagnosis    Lower resp. tract infection    Chemotherapy follow-up examination     NIKO 9-25-15= Exercise stress echo is normal. Walked 9 minutes, normal EF      Lymphoma, follicular, small and large cleaved-cell ([facility]    Gout    HTN (hypertension)          Past Medical History:   Diagnosis Date    [facility]     NHL    Chemotherapy follow-up examination     NIKO 9-25-15= Exercise stress echo is normal. Walked 9 minutes, normal EF    GERD (gastroesophageal reflux disease)     Gout 10/24/2011    Hypertension     Lymphoma, small cleaved-cell, follicular ([facility]     recurrent,currently on remission    Sun-damaged skin      Past Surgical History:   Procedure Laterality Date    HX HEENT      lymph node resection-midline,neck    HX TRANSPLANT  10/2013    bone marrow transplant     Social History     Socioeconomic History    Marital status:      Spouse name: Not on file    Number of children: Not on file    Years of education: Not on file    Highest education level: Not on file   Occupational History    Not on file   Social Needs    Financial resource strain: Not on file    Food insecurity:     Worry: Not on file     Inability: Not on file    Transportation needs:     Medical: Not on file     Non-medical: Not on file   Tobacco Use    Smoking status: Former Smoker     Packs/day: 1.00     Years: 7.00     Pack years: 7.00     Last attempt to quit: 1999     Years since quittin.8    Smokeless tobacco: Never Used   Substance and Sexual Activity    Alcohol use:  Yes     Alcohol/week: 3.5 oz     Types: 7 Standard drinks or equivalent per week    Drug use: No    Sexual activity: Yes     Partners: Female   Lifestyle    Physical activity:     Days per week: Not on file     Minutes per session: Not on file    Stress: Not on file   Relationships    Social connections:     Talks on phone: Not on file     Gets together: Not on file     Attends Yazdanism service: Not on file     Active member of club or organization: Not on file     Attends meetings of clubs or organizations: Not on file     Relationship status: Not on file    Intimate partner violence:     Fear of current or ex partner: Not on file     Emotionally abused: Not on file     Physically abused: Not on file     Forced sexual activity: Not on file   Other Topics Concern    Not on file   Social History Narrative    Works in [employer] at [address] [employer]. Family History   Problem Relation Age of Onset    Lung Disease Mother         COPD    Heart Disease Father         polymyalgia rheumatica    Hypertension Brother     Cancer Brother         testicular cancer     Allergies   Allergen Reactions    Lactose Nausea and Vomiting    Iodinated Contrast- Oral And Iv Dye Hives       Current Outpatient Medications   Medication Sig Dispense Refill    doxycycline (ADOXA) 100 mg tablet Take 2 Tabs by mouth once for 1 dose. 2 Tab 0    lisinopril-hydroCHLOROthiazide (PRINZIDE, ZESTORETIC) 20-12.5 mg per tablet Take 1 pill daily. 90 Tab 1    aspirin (ASPIRIN) 325 mg tablet Take 81 mg by mouth.  indomethacin (INDOCIN) 50 mg capsule Take 1 Cap by mouth two (2) times daily as needed (for up to 90 days) for up to 90 doses. 90 Cap 0    albuterol (PROVENTIL HFA, VENTOLIN HFA, PROAIR HFA) 90 mcg/actuation inhaler Take 1 Puff by inhalation every four (4) hours as needed for Wheezing. 1 Inhaler 0    ALPRAZolam (XANAX) 0.5 mg tablet Take 1 Tab by mouth nightly as needed for Anxiety for up to 15 doses. Max Daily Amount: 0.5 mg. 15 Tab 0           The medications were reviewed and updated in the medical record. The past medical history, past surgical history, and family history were reviewed and updated in the medical record. REVIEW OF SYSTEMS   Review of Systems   Constitutional: Negative for malaise/fatigue. HENT: Negative for congestion.     Eyes: Negative for blurred vision and pain. Respiratory: Negative for cough and shortness of breath. Cardiovascular: Negative for chest pain and palpitations. Gastrointestinal: Negative for abdominal pain and heartburn. Genitourinary: Negative for frequency and urgency. Musculoskeletal: Negative for joint pain and myalgias. Neurological: Negative for dizziness, tingling, sensory change, weakness and headaches. Psychiatric/Behavioral: Negative for depression, memory loss and substance abuse. PHYSICAL EXAM     Visit Vitals  /87 (BP 1 Location: Left arm, BP Patient Position: Sitting)   Pulse 76   Temp 98.2 °F (36.8 °C) (Oral)   Resp 16   Ht 6' 2\" (1.88 m)   Wt 219 lb 3.2 oz (99.4 kg)   SpO2 100%   BMI 28.14 kg/m²       Physical Exam   Constitutional: He is oriented to person, place, and time and well-developed, well-nourished, and in no distress. Vital signs are normal.   HENT:   Head: Normocephalic and atraumatic. Right Ear: Hearing, tympanic membrane, external ear and ear canal normal.   Left Ear: Hearing, tympanic membrane, external ear and ear canal normal.   Nose: Nose normal.   Mouth/Throat: Uvula is midline, oropharynx is clear and moist and mucous membranes are normal.   Eyes: Right eye visual fields normal and left eye visual fields normal. Pupils are equal, round, and reactive to light. Conjunctivae, EOM and lids are normal.   Fundoscopic exam:       The right eye shows no AV nicking. The left eye shows no AV nicking. Neck: Trachea normal. No thyromegaly present. Cardiovascular: S1 normal, S2 normal and normal heart sounds. Exam reveals no gallop and no friction rub. No murmur heard. Pulmonary/Chest: Effort normal and breath sounds normal.   Abdominal: Soft. Normal appearance. There is no hepatosplenomegaly. There is no tenderness. Musculoskeletal: Normal range of motion. Neurological: He is alert and oriented to person, place, and time. He has normal reflexes.  Gait normal.   Reflex Scores: Patellar reflexes are 2+ on the right side and 2+ on the left side. Skin: Skin is warm, dry and intact. Left lower abdomen, small erythematous scab consistent with patient reported tick bite. No erythema migrans present. Psychiatric: Mood, affect and judgment normal.       ASSESSMENT/ PLAN   Diagnoses and all orders for this visit:    1. Routine physical examination  -     CBC W/O DIFF  -     METABOLIC PANEL, COMPREHENSIVE  -     LIPID PANEL  -     TSH 3RD GENERATION  Provided Rx for Shingrix 2 vaccine series. Reviewed age appropriate safety and screening guidelines, as well as maintaining a healthy diet and exercise 150 minutes or more weekly. Wear SPF 15 or greater sunscreen and please wear seatbelt. Discussed safe sex practices. Patient with no further questions today. 2. Chronic gout involving toe without tophus, unspecified cause, unspecified laterality  -     URIC ACID  -Discussed dietary measures including limiting seafood and red meat. We will discuss preventative medicine if uric acid is elevated. 3. Tick bite of abdomen, initial encounter  -     doxycycline (ADOXA) 100 mg tablet; Take 2 Tabs by mouth once for 1 dose. -Mild redness and itching to the area, no erythema migrans. Will prophylax with 200 mg doxy dose per above. 4. Encounter for hepatitis C screening test for low risk patient  -     HEPATITIS C AB    5. Screening for prostate cancer  -     PSA, DIAGNOSTIC (PROSTATE SPECIFIC AG)    6. Lymphoma, follicular, small and large cleaved-cell (HCC)       -[facility]. 7. Essential hypertension  -     lisinopril-hydroCHLOROthiazide (PRINZIDE, ZESTORETIC) 20-12.5 mg per tablet; Take 1 pill daily.  -Well-controlled, refilled above medication.   -Checking CMP and TSH per above. 8. BMI 28.0-28.9,adult     - BMI discussed with patient. Discussed lifestyle changes, daily physical activity, and advised 150 minutes of exercise weekly.   Discussed healthy diet choices and limiting fried, fatty foods, fast foods, processed foods, sugar-sweetened beverages/soda, and added sugars. Increase fruits, vegetables, low-fat dairy products, lean proteins, and whole grains. Disclaimer:  Advised patient to call back or return to office if symptoms worsen/change/persist.  Discussed expected course/resolution/complications of diagnosis in detail with patient.     Medication risks/benefits/alternatives discussed with patient. Patient was given an after visit summary which includes diagnoses, current medications, & vitals.      Discussed patient instructions and advised to read to all patient instructions regarding care.      Patient expressed understanding with the diagnosis and plan. This note will not be viewable in [address] Rosana Villalobos NP  6/25/2019        (This document has been electronically signed)  Discussed the patient's BMI with him. The BMI follow up plan is as follows:     dietary management education, guidance, and counseling  encourage exercise  monitor weight  prescribed dietary intake    An After Visit Summary was printed and given to the patient.

## 2019-06-26 DIAGNOSIS — M1A.9XX0 CHRONIC GOUT INVOLVING TOE WITHOUT TOPHUS, UNSPECIFIED CAUSE, UNSPECIFIED LATERALITY: ICD-10-CM

## 2019-06-26 DIAGNOSIS — E79.0 ELEVATED URIC ACID IN BLOOD: Primary | ICD-10-CM

## 2019-06-26 LAB
ALBUMIN SERPL-MCNC: 4.5 G/DL (ref 3.5–5.5)
ALBUMIN/GLOB SERPL: 2.1 {RATIO} (ref 1.2–2.2)
ALP SERPL-CCNC: 73 IU/L (ref 39–117)
ALT SERPL-CCNC: 25 IU/L (ref 0–44)
AST SERPL-CCNC: 21 IU/L (ref 0–40)
BILIRUB SERPL-MCNC: 0.3 MG/DL (ref 0–1.2)
BUN SERPL-MCNC: 15 MG/DL (ref 6–24)
BUN/CREAT SERPL: 13 (ref 9–20)
CALCIUM SERPL-MCNC: 9.4 MG/DL (ref 8.7–10.2)
CHLORIDE SERPL-SCNC: 103 MMOL/L (ref 96–106)
CHOLEST SERPL-MCNC: 194 MG/DL (ref 100–199)
CO2 SERPL-SCNC: 25 MMOL/L (ref 20–29)
CREAT SERPL-MCNC: 1.19 MG/DL (ref 0.76–1.27)
ERYTHROCYTE [DISTWIDTH] IN BLOOD BY AUTOMATED COUNT: 13.3 % (ref 12.3–15.4)
GLOBULIN SER CALC-MCNC: 2.1 G/DL (ref 1.5–4.5)
GLUCOSE SERPL-MCNC: 87 MG/DL (ref 65–99)
HCT VFR BLD AUTO: 40 % (ref 37.5–51)
HCV AB S/CO SERPL IA: <0.1 S/CO RATIO (ref 0–0.9)
HDLC SERPL-MCNC: 55 MG/DL
HGB BLD-MCNC: 13.5 G/DL (ref 13–17.7)
LDLC SERPL CALC-MCNC: 124 MG/DL (ref 0–99)
MCH RBC QN AUTO: 34 PG (ref 26.6–33)
MCHC RBC AUTO-ENTMCNC: 33.8 G/DL (ref 31.5–35.7)
MCV RBC AUTO: 101 FL (ref 79–97)
PLATELET # BLD AUTO: 205 X10E3/UL (ref 150–450)
POTASSIUM SERPL-SCNC: 4.4 MMOL/L (ref 3.5–5.2)
PROT SERPL-MCNC: 6.6 G/DL (ref 6–8.5)
PSA SERPL-MCNC: 3.5 NG/ML (ref 0–4)
RBC # BLD AUTO: 3.97 X10E6/UL (ref 4.14–5.8)
SODIUM SERPL-SCNC: 141 MMOL/L (ref 134–144)
TRIGL SERPL-MCNC: 74 MG/DL (ref 0–149)
TSH SERPL DL<=0.005 MIU/L-ACNC: 1.65 UIU/ML (ref 0.45–4.5)
URATE SERPL-MCNC: 8.3 MG/DL (ref 3.7–8.6)
VLDLC SERPL CALC-MCNC: 15 MG/DL (ref 5–40)
WBC # BLD AUTO: 4.4 X10E3/UL (ref 3.4–10.8)

## 2019-06-26 RX ORDER — ALLOPURINOL 100 MG/1
100 TABLET ORAL DAILY
Qty: 30 TAB | Refills: 2 | Status: SHIPPED | OUTPATIENT
Start: 2019-06-26 | End: 2019-10-08 | Stop reason: SDUPTHER

## 2019-06-26 NOTE — PROGRESS NOTES
I called and reviewed the above lab work with the patient. I recommend continued healthy diet and exercise to help lower LDL cholesterol and I also would like to start him on allopurinol 100 mg for hyperuricemia and frequent gout flares. Return to the office in 10 to 12 weeks to recheck uric acid level.

## 2019-10-08 DIAGNOSIS — E79.0 ELEVATED URIC ACID IN BLOOD: ICD-10-CM

## 2019-10-08 DIAGNOSIS — M1A.9XX0 CHRONIC GOUT INVOLVING TOE WITHOUT TOPHUS, UNSPECIFIED CAUSE, UNSPECIFIED LATERALITY: ICD-10-CM

## 2019-10-09 RX ORDER — ALLOPURINOL 100 MG/1
TABLET ORAL
Qty: 30 TAB | Refills: 0 | Status: SHIPPED | OUTPATIENT
Start: 2019-10-09 | End: 2019-11-03 | Stop reason: SDUPTHER

## 2019-11-03 DIAGNOSIS — E79.0 ELEVATED URIC ACID IN BLOOD: ICD-10-CM

## 2019-11-03 DIAGNOSIS — M1A.9XX0 CHRONIC GOUT INVOLVING TOE WITHOUT TOPHUS, UNSPECIFIED CAUSE, UNSPECIFIED LATERALITY: ICD-10-CM

## 2019-11-04 RX ORDER — ALLOPURINOL 100 MG/1
TABLET ORAL
Qty: 30 TAB | Refills: 0 | Status: SHIPPED | OUTPATIENT
Start: 2019-11-04 | End: 2019-12-03 | Stop reason: SDUPTHER

## 2019-12-03 DIAGNOSIS — E79.0 ELEVATED URIC ACID IN BLOOD: ICD-10-CM

## 2019-12-03 DIAGNOSIS — M1A.9XX0 CHRONIC GOUT INVOLVING TOE WITHOUT TOPHUS, UNSPECIFIED CAUSE, UNSPECIFIED LATERALITY: ICD-10-CM

## 2019-12-03 RX ORDER — ALLOPURINOL 100 MG/1
TABLET ORAL
Qty: 30 TAB | Refills: 0 | Status: SHIPPED | OUTPATIENT
Start: 2019-12-03 | End: 2020-01-07

## 2019-12-20 DIAGNOSIS — I10 ESSENTIAL HYPERTENSION: ICD-10-CM

## 2019-12-20 RX ORDER — LISINOPRIL AND HYDROCHLOROTHIAZIDE 12.5; 2 MG/1; MG/1
TABLET ORAL
Qty: 90 TAB | Refills: 1 | Status: SHIPPED | OUTPATIENT
Start: 2019-12-20 | End: 2020-07-01

## 2020-01-07 DIAGNOSIS — E79.0 ELEVATED URIC ACID IN BLOOD: ICD-10-CM

## 2020-01-07 DIAGNOSIS — M1A.9XX0 CHRONIC GOUT INVOLVING TOE WITHOUT TOPHUS, UNSPECIFIED CAUSE, UNSPECIFIED LATERALITY: ICD-10-CM

## 2020-01-07 RX ORDER — ALLOPURINOL 100 MG/1
TABLET ORAL
Qty: 30 TAB | Refills: 0 | Status: SHIPPED | OUTPATIENT
Start: 2020-01-07 | End: 2020-04-03 | Stop reason: SDUPTHER

## 2020-02-24 ENCOUNTER — OFFICE VISIT (OUTPATIENT)
Dept: PRIMARY CARE CLINIC | Age: 60
End: 2020-02-24

## 2020-02-24 VITALS
OXYGEN SATURATION: 98 % | TEMPERATURE: 97.6 F | BODY MASS INDEX: 29.11 KG/M2 | SYSTOLIC BLOOD PRESSURE: 151 MMHG | WEIGHT: 226.8 LBS | HEART RATE: 83 BPM | DIASTOLIC BLOOD PRESSURE: 91 MMHG | HEIGHT: 74 IN | RESPIRATION RATE: 16 BRPM

## 2020-02-24 DIAGNOSIS — Z23 NEED FOR TDAP VACCINATION: ICD-10-CM

## 2020-02-24 DIAGNOSIS — B07.0 PLANTAR WART OF LEFT FOOT: ICD-10-CM

## 2020-02-24 DIAGNOSIS — S99.922A TOE INJURY, LEFT, INITIAL ENCOUNTER: Primary | ICD-10-CM

## 2020-02-24 NOTE — PROGRESS NOTES
[facility]   [address], [address] [facility]  P: [phone]  F: [phone]      Chief Complaint   Patient presents with    Foot Pain     this happened saturday night in the hot tub and states that he sliced left big toe under neath on a step on the hot tub.  about 10 pem       SUBJECTIVE   Dior Lucio is a 64 y.o. male who presents to clinic for Foot Pain (this happened saturday night in the hot tub and states that he sliced left big toe under neath on a step on the hot tub.  about 10 pem). HPI:    Keshawn Wyatt is a 64 yr old male who presents with left great toe laceration 3 days ago. He states he is applying topical Bacitracin from a prior injury that he had at home. He denies any fevers, drainage, or further bleeding from the area. He is also requesting to have a wart evaluated on the bottom of his left foot- under the 5th toe.      Patient Active Problem List    Diagnosis    Status post bone marrow transplant ([address]    Lower resp. tract infection    Chemotherapy follow-up examination     NIKO 9-25-15= Exercise stress echo is normal. Walked 9 minutes, normal EF      Lymphoma, follicular, small and large cleaved-cell ([address]    Gout    HTN (hypertension)          Past Medical History:   Diagnosis Date    Cancer ([address]     NHL    Chemotherapy follow-up examination     NIKO 9-25-15= Exercise stress echo is normal. Walked 9 minutes, normal EF    GERD (gastroesophageal reflux disease)     Gout 10/24/2011    Hypertension     Lymphoma, small cleaved-cell, follicular ([address]     recurrent,currently on remission    Sun-damaged skin      Past Surgical History:   Procedure Laterality Date    HX HEENT      lymph node resection-midline,neck    HX TRANSPLANT  10/2013    bone marrow transplant     Social History     Socioeconomic History    Marital status:      Spouse name: Not on file    Number of children: Not on file    Years of education: Not on file    Highest education level: Not on file   Occupational History    Not on file   Social Needs    Financial resource strain: Not on file    Food insecurity:     Worry: Not on file     Inability: Not on file    Transportation needs:     Medical: Not on file     Non-medical: Not on file   Tobacco Use    Smoking status: Former Smoker     Packs/day: 1.00     Years: 7.00     Pack years: 7.00     Last attempt to quit: 1999     Years since quittin.4    Smokeless tobacco: Never Used   Substance and Sexual Activity    Alcohol use: Yes     Alcohol/week: 5.8 standard drinks     Types: 7 Standard drinks or equivalent per week    Drug use: No    Sexual activity: Yes     Partners: Female   Lifestyle    Physical activity:     Days per week: Not on file     Minutes per session: Not on file    Stress: Not on file   Relationships    Social connections:     Talks on phone: Not on file     Gets together: Not on file     Attends Druze service: Not on file     Active member of club or organization: Not on file     Attends meetings of clubs or organizations: Not on file     Relationship status: Not on file    Intimate partner violence:     Fear of current or ex partner: Not on file     Emotionally abused: Not on file     Physically abused: Not on file     Forced sexual activity: Not on file   Other Topics Concern    Not on file   Social History Narrative    Works in [employer] at [employer].       Family History   Problem Relation Age of Onset    Lung Disease Mother         COPD    Heart Disease Father         polymyalgia rheumatica    Hypertension Brother     Cancer Brother         testicular cancer     Allergies   Allergen Reactions    Lactose Nausea and Vomiting    Iodinated Contrast Media Hives       Current Outpatient Medications   Medication Sig Dispense Refill    allopurinol (ZYLOPRIM) 100 mg tablet TAKE 1 TABLET BY MOUTH DAILY 30 Tab 0    lisinopril-hydroCHLOROthiazide (PRINZIDE, ZESTORETIC) 20-12.5 mg per tablet TAKE 1 TABLET BY MOUTH DAILY 90 Tab 1    indomethacin (INDOCIN) 50 mg capsule Take 1 Cap by mouth two (2) times daily as needed (for up to 90 days) for up to 90 doses. 90 Cap 0    aspirin (ASPIRIN) 325 mg tablet Take 81 mg by mouth.  albuterol (PROVENTIL HFA, VENTOLIN HFA, PROAIR HFA) 90 mcg/actuation inhaler Take 1 Puff by inhalation every four (4) hours as needed for Wheezing. 1 Inhaler 0    ALPRAZolam (XANAX) 0.5 mg tablet Take 1 Tab by mouth nightly as needed for Anxiety for up to 15 doses. Max Daily Amount: 0.5 mg. 15 Tab 0           The medications were reviewed and updated in the medical record. The past medical history, past surgical history, and family history were reviewed and updated in the medical record. REVIEW OF SYSTEMS   Review of Systems   Constitutional: Negative for malaise/fatigue. HENT: Negative for congestion. Eyes: Negative for blurred vision and pain. Respiratory: Negative for cough and shortness of breath. Cardiovascular: Negative for chest pain and palpitations. Gastrointestinal: Negative for abdominal pain and heartburn. Genitourinary: Negative for frequency and urgency. Musculoskeletal: Negative for joint pain and myalgias. Skin:        Wound left great toe   Neurological: Negative for dizziness, tingling, sensory change, weakness and headaches. Psychiatric/Behavioral: Negative for depression, memory loss and substance abuse. PHYSICAL EXAM     Visit Vitals  BP (!) 151/91 (BP 1 Location: Left arm, BP Patient Position: Sitting)   Pulse 83   Temp 97.6 °F (36.4 °C) (Oral)   Resp 16   Ht 6' 2\" (1.88 m)   Wt 226 lb 12.8 oz (102.9 kg)   SpO2 98%   BMI 29.12 kg/m²       Physical Exam  Vitals signs and nursing note reviewed. HENT:      Head: Normocephalic and atraumatic. Right Ear: External ear normal.      Left Ear: External ear normal.   Cardiovascular:      Rate and Rhythm: Normal rate and regular rhythm.       Heart sounds: Normal heart sounds. Pulmonary:      Effort: Pulmonary effort is normal.      Breath sounds: Normal breath sounds. Musculoskeletal: Normal range of motion. Feet:       Comments: L posterior foot plantar wart. Cryotherapy Procedure Note:     Patient consented to procedure prior, subsequently a timeout was performed. Risks were discussed with the patient including but not limited to localized redness, swelling, and blistering. Cryotherapy kit with cotton tip applicator used to apply 3 short bursts (8 to 10 seconds each) to each affected area of skin. Patient tolerated well with no adverse side effects reported. Skin:     General: Skin is warm and dry. Neurological:      Mental Status: He is alert and oriented to person, place, and time. Gait: Gait is intact. Psychiatric:         Mood and Affect: Affect normal.         Judgment: Judgment normal.         ASSESSMENT/ PLAN   Diagnoses and all orders for this visit:    1. Toe injury, left, initial encounter         -Provided topical bacitracin in office today. Discussed oral antibiotics if the wound is not healing well over the next 3-5 days.    -     TETANUS, DIPHTHERIA TOXOIDS AND ACELLULAR PERTUSSIS VACCINE (TDAP), IN INDIVIDS. >=7, IM    2. Need for Tdap vaccination  -     TETANUS, DIPHTHERIA TOXOIDS AND ACELLULAR PERTUSSIS VACCINE (TDAP), IN INDIVIDS. >=7, IM    3. Plantar wart of left foot        -In office cryotherapy today. Elevated BP monitor at f/u visit. Disclaimer:  Advised patient to call back or return to office if symptoms worsen/change/persist.  Discussed expected course/resolution/complications of diagnosis in detail with patient.     Medication risks/benefits/alternatives discussed with patient.   Patient was given an after visit summary which includes diagnoses, current medications, & vitals.      Discussed patient instructions and advised to read to all patient instructions regarding care.      Patient expressed understanding with the diagnosis and plan. This note will not be viewable in [address]         Rhonda Rouse NP  2/24/2020        (This document has been electronically signed)

## 2020-04-03 DIAGNOSIS — E79.0 ELEVATED URIC ACID IN BLOOD: ICD-10-CM

## 2020-04-03 DIAGNOSIS — M1A.9XX0 CHRONIC GOUT INVOLVING TOE WITHOUT TOPHUS, UNSPECIFIED CAUSE, UNSPECIFIED LATERALITY: ICD-10-CM

## 2020-04-03 DIAGNOSIS — R06.2 WHEEZING: ICD-10-CM

## 2020-04-03 RX ORDER — ALBUTEROL SULFATE 90 UG/1
1 AEROSOL, METERED RESPIRATORY (INHALATION)
Qty: 1 INHALER | Refills: 2 | Status: SHIPPED | OUTPATIENT
Start: 2020-04-03 | End: 2020-07-22 | Stop reason: SDUPTHER

## 2020-04-03 RX ORDER — ALLOPURINOL 100 MG/1
TABLET ORAL
Qty: 30 TAB | Refills: 1 | Status: SHIPPED | OUTPATIENT
Start: 2020-04-03 | End: 2020-06-29 | Stop reason: SDUPTHER

## 2020-04-06 ENCOUNTER — OFFICE VISIT (OUTPATIENT)
Dept: PRIMARY CARE CLINIC | Age: 60
End: 2020-04-06

## 2020-04-06 ENCOUNTER — TELEPHONE (OUTPATIENT)
Dept: PRIMARY CARE CLINIC | Age: 60
End: 2020-04-06

## 2020-04-06 VITALS
OXYGEN SATURATION: 98 % | HEART RATE: 77 BPM | DIASTOLIC BLOOD PRESSURE: 72 MMHG | RESPIRATION RATE: 16 BRPM | TEMPERATURE: 98 F | WEIGHT: 226 LBS | BODY MASS INDEX: 29 KG/M2 | SYSTOLIC BLOOD PRESSURE: 128 MMHG | HEIGHT: 74 IN

## 2020-04-06 DIAGNOSIS — B07.0 PLANTAR WART OF LEFT FOOT: Primary | ICD-10-CM

## 2020-04-06 NOTE — PROGRESS NOTES
[facility]   [address] [facility], [address] [facility]  P: [phone]  F: [phone]      Chief Complaint   Patient presents with    Warts     Patient presents today to have a plantar wart located on his left plantar 5th MPJ froze off       Dajuan Hernández is a 64 y.o. male who presents to clinic for Warts (Patient presents today to have a plantar wart located on his left plantar 5th MPJ froze off). HPI:  Here for follow-up second round of cryotherapy to left fifth toe for plantar wart destruction.     Patient Active Problem List    Diagnosis    Status [facility]    Lower resp. tract infection    Chemotherapy follow-up examination     NIKO 9-25-15= Exercise stress echo is normal. Walked 9 minutes, normal EF      Lymphoma, follicular, small and large cleaved-cell ([address]    Gout    HTN (hypertension)          Past Medical History:   Diagnosis Date    Cancer ([facility]     NHL    Chemotherapy follow-up examination     NIKO 9-25-15= Exercise stress echo is normal. Walked 9 minutes, normal EF    GERD (gastroesophageal reflux disease)     Gout 10/24/2011    Hypertension     Lymphoma, small cleaved-cell, follicular ([address]     recurrent,currently on remission    Sun-damaged skin      Past Surgical History:   Procedure Laterality Date    HX HEENT      lymph node resection-midline,neck    HX TRANSPLANT  10/2013    bone marrow transplant     Social History     Socioeconomic History    Marital status:      Spouse name: Not on file    Number of children: Not on file    Years of education: Not on file    Highest education level: Not on file   Occupational History    Not on file   Social Needs    Financial resource strain: Not on file    Food insecurity     Worry: Not on file     Inability: Not on file    Transportation needs     Medical: Not on file     Non-medical: Not on file   Tobacco Use    Smoking status: Former Smoker     Packs/day: 1.00 Years: 7.00     Pack years: 7.00     Last attempt to quit: 1999     Years since quittin.6    Smokeless tobacco: Never Used   Substance and Sexual Activity    Alcohol use: Yes     Alcohol/week: 5.8 standard drinks     Types: 7 Standard drinks or equivalent per week    Drug use: No    Sexual activity: Yes     Partners: Female   Lifestyle    Physical activity     Days per week: Not on file     Minutes per session: Not on file    Stress: Not on file   Relationships    Social connections     Talks on phone: Not on file     Gets together: Not on file     Attends Confucianist service: Not on file     Active member of club or organization: Not on file     Attends meetings of clubs or organizations: Not on file     Relationship status: Not on file    Intimate partner violence     Fear of current or ex partner: Not on file     Emotionally abused: Not on file     Physically abused: Not on file     Forced sexual activity: Not on file   Other Topics Concern    Not on file   Social History Narrative    Works in [employer] at [employer]. Family History   Problem Relation Age of Onset    Lung Disease Mother         COPD    Heart Disease Father         polymyalgia rheumatica    Hypertension Brother     Cancer Brother         testicular cancer     Allergies   Allergen Reactions    Lactose Nausea and Vomiting    Iodinated Contrast Media Hives       Current Outpatient Medications   Medication Sig Dispense Refill    allopurinoL (ZYLOPRIM) 100 mg tablet TAKE 1 TABLET BY MOUTH DAILY 30 Tab 1    albuterol (PROVENTIL HFA, VENTOLIN HFA, PROAIR HFA) 90 mcg/actuation inhaler Take 1 Puff by inhalation every four (4) hours as needed for Wheezing. 1 Inhaler 2    lisinopril-hydroCHLOROthiazide (PRINZIDE, ZESTORETIC) 20-12.5 mg per tablet TAKE 1 TABLET BY MOUTH DAILY 90 Tab 1    indomethacin (INDOCIN) 50 mg capsule Take 1 Cap by mouth two (2) times daily as needed (for up to 90 days) for up to 90 doses.  80 Cap 0    aspirin (ASPIRIN) 325 mg tablet Take 81 mg by mouth.  ALPRAZolam (XANAX) 0.5 mg tablet Take 1 Tab by mouth nightly as needed for Anxiety for up to 15 doses. Max Daily Amount: 0.5 mg. 15 Tab 0           The medications were reviewed and updated in the medical record. The past medical history, past surgical history, and family history were reviewed and updated in the medical record. REVIEW OF SYSTEMS   Review of Systems   Constitutional: Negative for malaise/fatigue. HENT: Negative for congestion. Eyes: Negative for blurred vision and pain. Respiratory: Negative for cough and shortness of breath. Cardiovascular: Negative for chest pain and palpitations. Gastrointestinal: Negative for abdominal pain and heartburn. Genitourinary: Negative for frequency and urgency. Musculoskeletal: Negative for joint pain and myalgias. Neurological: Negative for dizziness, tingling, sensory change, weakness and headaches. Psychiatric/Behavioral: Negative for depression, memory loss and substance abuse. PHYSICAL EXAM     Visit Vitals  /72 (BP 1 Location: Left arm, BP Patient Position: Sitting)   Pulse 77   Temp 98 °F (36.7 °C) (Core)   Resp 16   Ht 6' 2\" (1.88 m)   Wt 226 lb (102.5 kg)   SpO2 98%   BMI 29.02 kg/m²     Physical Exam  Vitals signs and nursing note reviewed. HENT:      Head: Normocephalic and atraumatic. Right Ear: External ear normal.      Left Ear: External ear normal.   Cardiovascular:      Rate and Rhythm: Normal rate and regular rhythm. Heart sounds: Normal heart sounds. Pulmonary:      Effort: Pulmonary effort is normal.      Breath sounds: Normal breath sounds. Musculoskeletal: Normal range of motion. Feet:       Comments: Cryotherapy Procedure Note:     Patient consented to procedure prior, subsequently a timeout was performed. Risks were discussed with the patient including but not limited to localized redness, swelling, and blistering. Cryotherapy kit with cotton tip applicator used to apply 3 short bursts (8 to 10 seconds each) to each affected area of skin. Patient tolerated well with no adverse side effects reported. Skin:     General: Skin is warm and dry. Neurological:      Mental Status: He is alert and oriented to person, place, and time. Gait: Gait is intact. Psychiatric:         Mood and Affect: Affect normal.         Judgment: Judgment normal.       ASSESSMENT/ PLAN   Diagnoses and all orders for this visit:    1. Plantar wart of left foot      -Consent signed in office for cryo, patient tolerated procedure well. Discussed may have to return in 2 to 3 weeks for repeat cryo. Disclaimer:  Advised patient to call back or return to office if symptoms worsen/change/persist.  Discussed expected course/resolution/complications of diagnosis in detail with patient.     Medication risks/benefits/alternatives discussed with patient. Patient was given an after visit summary which includes diagnoses, current medications, & vitals.      Discussed patient instructions and advised to read to all patient instructions regarding care.      Patient expressed understanding with the diagnosis and plan. This note will not be viewable in [address]         Alison Knapp NP  4/6/2020        (This document has been electronically signed)

## 2020-04-06 NOTE — PROGRESS NOTES
Chief Complaint   Patient presents with    Warts     Patient presents today to have a plantar wart located on his left plantar 5th MPJ froze off

## 2020-06-29 DIAGNOSIS — M1A.9XX0 CHRONIC GOUT INVOLVING TOE WITHOUT TOPHUS, UNSPECIFIED CAUSE, UNSPECIFIED LATERALITY: ICD-10-CM

## 2020-06-29 DIAGNOSIS — E79.0 ELEVATED URIC ACID IN BLOOD: ICD-10-CM

## 2020-06-29 RX ORDER — ALLOPURINOL 100 MG/1
TABLET ORAL
Qty: 30 TAB | Refills: 1 | Status: SHIPPED | OUTPATIENT
Start: 2020-06-29 | End: 2020-09-02 | Stop reason: SDUPTHER

## 2020-07-01 DIAGNOSIS — I10 ESSENTIAL HYPERTENSION: ICD-10-CM

## 2020-07-01 RX ORDER — LISINOPRIL AND HYDROCHLOROTHIAZIDE 12.5; 2 MG/1; MG/1
TABLET ORAL
Qty: 90 TAB | Refills: 1 | Status: SHIPPED | OUTPATIENT
Start: 2020-07-01 | End: 2021-01-28 | Stop reason: SDUPTHER

## 2020-07-22 DIAGNOSIS — R06.2 WHEEZING: ICD-10-CM

## 2020-07-22 RX ORDER — ALBUTEROL SULFATE 90 UG/1
1 AEROSOL, METERED RESPIRATORY (INHALATION)
Qty: 1 INHALER | Refills: 2 | Status: SHIPPED | OUTPATIENT
Start: 2020-07-22

## 2020-09-02 DIAGNOSIS — M1A.9XX0 CHRONIC GOUT INVOLVING TOE WITHOUT TOPHUS, UNSPECIFIED CAUSE, UNSPECIFIED LATERALITY: ICD-10-CM

## 2020-09-02 DIAGNOSIS — E79.0 ELEVATED URIC ACID IN BLOOD: ICD-10-CM

## 2020-09-02 RX ORDER — ALLOPURINOL 100 MG/1
TABLET ORAL
Qty: 30 TAB | Refills: 1 | Status: SHIPPED | OUTPATIENT
Start: 2020-09-02 | End: 2020-11-03 | Stop reason: SDUPTHER

## 2020-09-24 ENCOUNTER — OFFICE VISIT (OUTPATIENT)
Dept: PRIMARY CARE CLINIC | Age: 60
End: 2020-09-24
Payer: COMMERCIAL

## 2020-09-24 VITALS
OXYGEN SATURATION: 98 % | RESPIRATION RATE: 16 BRPM | BODY MASS INDEX: 29.11 KG/M2 | DIASTOLIC BLOOD PRESSURE: 91 MMHG | SYSTOLIC BLOOD PRESSURE: 144 MMHG | HEART RATE: 107 BPM | HEIGHT: 74 IN | WEIGHT: 226.8 LBS | TEMPERATURE: 97.1 F

## 2020-09-24 DIAGNOSIS — L30.9 DERMATITIS: Primary | ICD-10-CM

## 2020-09-24 PROCEDURE — 99213 OFFICE O/P EST LOW 20 MIN: CPT | Performed by: NURSE PRACTITIONER

## 2020-09-24 RX ORDER — DOXYCYCLINE 100 MG/1
100 TABLET ORAL 2 TIMES DAILY
Qty: 20 TAB | Refills: 0 | Status: SHIPPED | OUTPATIENT
Start: 2020-09-24 | End: 2020-10-04

## 2020-09-24 RX ORDER — TRIAMCINOLONE ACETONIDE 1 MG/G
OINTMENT TOPICAL 2 TIMES DAILY
Qty: 30 G | Refills: 0 | Status: SHIPPED | OUTPATIENT
Start: 2020-09-24 | End: 2021-06-22 | Stop reason: ALTCHOICE

## 2020-09-24 NOTE — PROGRESS NOTES
Chief Complaint   Patient presents with   Roby Boles     patient presents today with a possible bug bite that has been present since Monday that is really itchy he took a benadryll for the itching and inflammation

## 2020-09-24 NOTE — PROGRESS NOTES
[facility]   [facility] [address] [address], [address] [facility]  P: [phone]  F: [phone]    KAYLA Mckeon is a 64 y.o. male who presents to clinic for Insect Bite to R upper thigh 3 days ago. He took some Benadryl for itching to the area, with improvement. He is unsure what he was bit by, possibly a tick or spider. Patient Active Problem List    Diagnosis    Status post [facility] ([facility]    Lower resp. tract infection    Chemotherapy follow-up examination     NIKO 9-25-15= Exercise stress echo is normal. Walked 9 minutes, normal EF      Lymphoma, follicular, small and large cleaved-cell ([facility]    Gout    HTN (hypertension)          Past Medical History:   Diagnosis Date    Cancer ([facility]     NHL    Chemotherapy follow-up examination     NIKO 9-25-15= Exercise stress echo is normal. Walked 9 minutes, normal EF    GERD (gastroesophageal reflux disease)     Gout 10/24/2011    Hypertension     Lymphoma, small cleaved-cell, follicular ([facility]     recurrent,currently on remission    Sun-damaged skin      Past Surgical History:   Procedure Laterality Date    HX HEENT      lymph node resection-midline,neck    HX TRANSPLANT  10/2013    bone marrow transplant     Social History     Socioeconomic History    Marital status:      Spouse name: Not on file    Number of children: Not on file    Years of education: Not on file    Highest education level: Not on file   Occupational History    Not on file   Social Needs    Financial resource strain: Not on file    Food insecurity     Worry: Not on file     Inability: Not on file    Transportation needs     Medical: Not on file     Non-medical: Not on file   Tobacco Use    Smoking status: Former Smoker     Packs/day: 1.00     Years: 7.00     Pack years: 7.00     Last attempt to quit: 1999     Years since quittin.0    Smokeless tobacco: Never Used   Substance and Sexual Activity    Alcohol use:  Yes Alcohol/week: 5.8 standard drinks     Types: 7 Standard drinks or equivalent per week    Drug use: No    Sexual activity: Yes     Partners: Female   Lifestyle    Physical activity     Days per week: Not on file     Minutes per session: Not on file    Stress: Not on file   Relationships    Social connections     Talks on phone: Not on file     Gets together: Not on file     Attends Congregation service: Not on file     Active member of club or organization: Not on file     Attends meetings of clubs or organizations: Not on file     Relationship status: Not on file    Intimate partner violence     Fear of current or ex partner: Not on file     Emotionally abused: Not on file     Physically abused: Not on file     Forced sexual activity: Not on file   Other Topics Concern    Not on file   Social History Narrative    Works in [employer] at [employer]. Family History   Problem Relation Age of Onset    Lung Disease Mother         COPD    Heart Disease Father         polymyalgia rheumatica    Hypertension Brother     Cancer Brother         testicular cancer     Allergies   Allergen Reactions    Lactose Nausea and Vomiting    Iodinated Contrast Media Hives       Current Outpatient Medications   Medication Sig Dispense Refill    triamcinolone acetonide (KENALOG) 0.1 % ointment Apply  to affected area two (2) times a day. use thin layer 30 g 0    doxycycline (ADOXA) 100 mg tablet Take 1 Tab by mouth two (2) times a day for 10 days. 20 Tab 0    allopurinoL (ZYLOPRIM) 100 mg tablet TAKE 1 TABLET BY MOUTH DAILY 30 Tab 1    albuterol (PROVENTIL HFA, VENTOLIN HFA, PROAIR HFA) 90 mcg/actuation inhaler Take 1 Puff by inhalation every four (4) hours as needed for Wheezing.  1 Inhaler 2    lisinopril-hydroCHLOROthiazide (PRINZIDE, ZESTORETIC) 20-12.5 mg per tablet TAKE 1 TABLET BY MOUTH DAILY 90 Tab 1    indomethacin (INDOCIN) 50 mg capsule Take 1 Cap by mouth two (2) times daily as needed (for up to 90 days) for up to 90 doses. 90 Cap 0    aspirin (ASPIRIN) 325 mg tablet Take 81 mg by mouth.  ALPRAZolam (XANAX) 0.5 mg tablet Take 1 Tab by mouth nightly as needed for Anxiety for up to 15 doses. Max Daily Amount: 0.5 mg. 15 Tab 0           The medications were reviewed and updated in the medical record. The past medical history, past surgical history, and family history were reviewed and updated in the medical record. REVIEW OF SYSTEMS   Review of Systems   Constitutional: Negative for malaise/fatigue. HENT: Negative for congestion. Eyes: Negative for blurred vision and pain. Respiratory: Negative for cough and shortness of breath. Cardiovascular: Negative for chest pain and palpitations. Gastrointestinal: Negative for abdominal pain and heartburn. Genitourinary: Negative for frequency and urgency. Musculoskeletal: Negative for joint pain and myalgias. Skin: Positive for itching and rash. Neurological: Negative for dizziness, tingling, sensory change, weakness and headaches. Psychiatric/Behavioral: Negative for depression, memory loss and substance abuse. PHYSICAL EXAM     Visit Vitals  BP (!) 144/91 (BP 1 Location: Left arm, BP Patient Position: Sitting)   Pulse (!) 107   Temp 97.1 °F (36.2 °C) (Temporal)   Resp 16   Ht 6' 2\" (1.88 m)   Wt 226 lb 12.8 oz (102.9 kg)   SpO2 98%   BMI 29.12 kg/m²       Physical Exam  Vitals signs and nursing note reviewed. HENT:      Head: Normocephalic and atraumatic. Right Ear: External ear normal.      Left Ear: External ear normal.   Cardiovascular:      Rate and Rhythm: Normal rate and regular rhythm. Heart sounds: Normal heart sounds. Pulmonary:      Effort: Pulmonary effort is normal.      Breath sounds: Normal breath sounds. Musculoskeletal: Normal range of motion. Skin:     General: Skin is warm and dry. Comments: Small oval area of erythema, no central clearing or tick present.     Neurological:      Mental Status: He is alert and oriented to person, place, and time. Gait: Gait is intact. Psychiatric:         Mood and Affect: Affect normal.         Judgment: Judgment normal.       ASSESSMENT/ PLAN   Diagnoses and all orders for this visit:    1. Dermatitis  -     triamcinolone acetonide (KENALOG) 0.1 % ointment; Apply  to affected area two (2) times a day. use thin layer  -     doxycycline (ADOXA) 100 mg tablet; Take 1 Tab by mouth two (2) times a day for 10 days.  -Since improving, should try topical Kenalog over the next several days. We discussed if not improving he can  the antibiotic and take. Disclaimer:  Advised patient to call back or return to office if symptoms worsen/change/persist.  Discussed expected course/resolution/complications of diagnosis in detail with patient.     Medication risks/benefits/alternatives discussed with patient. Patient was given an after visit summary which includes diagnoses, current medications, & vitals.      Discussed patient instructions and advised to read to all patient instructions regarding care.      Patient expressed understanding with the diagnosis and plan. This note will not be viewable in [address]         Ck Tiwari NP  9/24/2020        (This document has been electronically signed)

## 2020-11-03 DIAGNOSIS — E79.0 ELEVATED URIC ACID IN BLOOD: ICD-10-CM

## 2020-11-03 DIAGNOSIS — M1A.9XX0 CHRONIC GOUT INVOLVING TOE WITHOUT TOPHUS, UNSPECIFIED CAUSE, UNSPECIFIED LATERALITY: ICD-10-CM

## 2020-11-03 RX ORDER — ALLOPURINOL 100 MG/1
TABLET ORAL
Qty: 30 TAB | Refills: 1 | Status: SHIPPED | OUTPATIENT
Start: 2020-11-03 | End: 2021-01-01

## 2020-12-16 DIAGNOSIS — R01.1 HEART MURMUR: Primary | ICD-10-CM

## 2020-12-17 DIAGNOSIS — R01.1 HEART MURMUR: Primary | ICD-10-CM

## 2021-01-01 DIAGNOSIS — M1A.9XX0 CHRONIC GOUT INVOLVING TOE WITHOUT TOPHUS, UNSPECIFIED CAUSE, UNSPECIFIED LATERALITY: ICD-10-CM

## 2021-01-01 DIAGNOSIS — E79.0 ELEVATED URIC ACID IN BLOOD: ICD-10-CM

## 2021-01-01 RX ORDER — ALLOPURINOL 100 MG/1
TABLET ORAL
Qty: 30 TAB | Refills: 1 | Status: SHIPPED | OUTPATIENT
Start: 2021-01-01 | End: 2021-04-16

## 2021-01-02 NOTE — H&P
Interventional Radiology History and Physical (Outpatient)    8/30/2017    Patient: Leida Sosa 56 y.o. male     Referring Physician:  Saul Perez NP    Chief Complaint: need biospy    History of Present Illness: hx of lymphoma    History:  Past Medical History:   Diagnosis Date    [facility]     NHL    Chemotherapy follow-up examination     NIKO 9-25-15= Exercise stress echo is normal. Walked 9 minutes, normal EF    GERD (gastroesophageal reflux disease)     Gout 10/24/2011    Hypertension     Lymphoma, small cleaved-cell, follicular ([facility]     recurrent,currently on remission     Family History   Problem Relation Age of Onset    Lung Disease Mother      COPD    Heart Disease Father      polymyalgia rheumatica    Hypertension Brother     Cancer Brother      testicular cancer     Social History     Social History    Marital status:      Spouse name: N/A    Number of children: N/A    Years of education: N/A     Occupational History    Not on file. Social History Main Topics    Smoking status: Former Smoker     Packs/day: 1.00     Years: 7.00     Quit date: 9/4/1999    Smokeless tobacco: Never Used    Alcohol use 3.5 oz/week     7 Standard drinks or equivalent per week    Drug use: No    Sexual activity: Yes     Partners: Female     Other Topics Concern    Not on file     Social History Narrative    Works in [employer] at [employer] Allergies: Allergies   Allergen Reactions    Lactose Nausea and Vomiting    Iodinated Contrast- Oral And Iv Dye Hives       Prior to Admission Medications:  Prior to Admission medications    Medication Sig Start Date End Date Taking? Authorizing Provider   lisinopril-hydroCHLOROthiazide (PRINZIDE, ZESTORETIC) 20-12.5 mg per tablet TAKE 1 TABLET BY MOUTH DAILY 7/31/17   Lety Robles NP   indomethacin (INDOCIN) 50 mg capsule Take 1 Cap by mouth two (2) times a day for 90 days.  6/7/17 9/5/17  Miller Lee MD   aspirin [Dear  ___] : Dear  [unfilled], delayed-release 81 mg tablet Take  by mouth daily. Historical Provider       Physical Exam:    Blood pressure 145/69, pulse 79, resp. rate 18, height 6' 2\" (1.88 m), weight 101.2 kg (223 lb), SpO2 99 %. General: alert, cooperative, no distress, appears stated age  Heart: rrr  Lungs: clear to auscultation bilaterally  Abdomen: soft  Neuro: grossly intact  Extremities: extremities     Plan of Care/Planned Procedure:  Risks, benefits, and alternatives reviewed with patient and he agrees to proceed with the procedure.      Bernabe Jennings MD [Courtesy Letter:] : I had the pleasure of seeing your patient, [unfilled], in my office today. [Please see my note below.] : Please see my note below. [Sincerely,] : Sincerely, [FreeTextEntry3] : Rivka Leyva MD

## 2021-01-28 DIAGNOSIS — I10 ESSENTIAL HYPERTENSION: ICD-10-CM

## 2021-01-28 RX ORDER — LISINOPRIL AND HYDROCHLOROTHIAZIDE 12.5; 2 MG/1; MG/1
TABLET ORAL
Qty: 90 TAB | Refills: 1 | Status: SHIPPED | OUTPATIENT
Start: 2021-01-28 | End: 2021-03-17 | Stop reason: SDUPTHER

## 2021-02-08 DIAGNOSIS — M1A.9XX0 CHRONIC GOUT INVOLVING TOE WITHOUT TOPHUS, UNSPECIFIED CAUSE, UNSPECIFIED LATERALITY: ICD-10-CM

## 2021-02-08 RX ORDER — INDOMETHACIN 50 MG/1
50 CAPSULE ORAL
Qty: 90 CAP | Refills: 0 | Status: SHIPPED | OUTPATIENT
Start: 2021-02-08 | End: 2021-03-20

## 2021-03-17 DIAGNOSIS — I10 ESSENTIAL HYPERTENSION: ICD-10-CM

## 2021-03-17 RX ORDER — LISINOPRIL AND HYDROCHLOROTHIAZIDE 12.5; 2 MG/1; MG/1
TABLET ORAL
Qty: 90 TAB | Refills: 1 | Status: SHIPPED | OUTPATIENT
Start: 2021-03-17 | End: 2022-03-08 | Stop reason: SDUPTHER

## 2021-04-16 DIAGNOSIS — E79.0 ELEVATED URIC ACID IN BLOOD: ICD-10-CM

## 2021-04-16 DIAGNOSIS — M1A.9XX0 CHRONIC GOUT INVOLVING TOE WITHOUT TOPHUS, UNSPECIFIED CAUSE, UNSPECIFIED LATERALITY: ICD-10-CM

## 2021-04-16 RX ORDER — ALLOPURINOL 100 MG/1
TABLET ORAL
Qty: 30 TAB | Refills: 1 | Status: SHIPPED | OUTPATIENT
Start: 2021-04-16 | End: 2021-06-14

## 2021-05-09 DIAGNOSIS — M1A.9XX0 CHRONIC GOUT INVOLVING TOE WITHOUT TOPHUS, UNSPECIFIED CAUSE, UNSPECIFIED LATERALITY: ICD-10-CM

## 2021-05-09 RX ORDER — INDOMETHACIN 50 MG/1
CAPSULE ORAL
Qty: 90 CAP | Refills: 0 | Status: SHIPPED | OUTPATIENT
Start: 2021-05-09 | End: 2021-06-29

## 2021-06-14 DIAGNOSIS — E79.0 ELEVATED URIC ACID IN BLOOD: ICD-10-CM

## 2021-06-14 DIAGNOSIS — M1A.9XX0 CHRONIC GOUT INVOLVING TOE WITHOUT TOPHUS, UNSPECIFIED CAUSE, UNSPECIFIED LATERALITY: ICD-10-CM

## 2021-06-14 RX ORDER — ALLOPURINOL 100 MG/1
TABLET ORAL
Qty: 30 TABLET | Refills: 1 | Status: SHIPPED | OUTPATIENT
Start: 2021-06-14 | End: 2021-08-13

## 2021-06-22 DIAGNOSIS — R21 RASH: Primary | ICD-10-CM

## 2021-06-22 RX ORDER — TRIAMCINOLONE ACETONIDE 1 MG/G
OINTMENT TOPICAL 2 TIMES DAILY
Qty: 30 G | Refills: 0 | Status: SHIPPED | OUTPATIENT
Start: 2021-06-22 | End: 2022-09-29

## 2021-12-16 ENCOUNTER — VIRTUAL VISIT (OUTPATIENT)
Dept: PRIMARY CARE CLINIC | Age: 61
End: 2021-12-16
Payer: COMMERCIAL

## 2021-12-16 DIAGNOSIS — R05.9 COUGH: Primary | ICD-10-CM

## 2021-12-16 DIAGNOSIS — U07.1 COVID-19: ICD-10-CM

## 2021-12-16 PROCEDURE — 99213 OFFICE O/P EST LOW 20 MIN: CPT | Performed by: INTERNAL MEDICINE

## 2021-12-16 RX ORDER — BENZONATATE 100 MG/1
100 CAPSULE ORAL
Qty: 30 CAPSULE | Refills: 0 | Status: SHIPPED | OUTPATIENT
Start: 2021-12-16 | End: 2022-09-29

## 2021-12-16 RX ORDER — AZITHROMYCIN 250 MG/1
TABLET, FILM COATED ORAL
Qty: 6 TABLET | Refills: 0 | Status: SHIPPED | OUTPATIENT
Start: 2021-12-16 | End: 2021-12-21

## 2021-12-16 NOTE — PROGRESS NOTES
Deshawn Nash is a 63 y.o. male who was seen by synchronous (real-time) audio-video technology on 12/16/2021 for Cough        Assessment & Plan:   Diagnoses and all orders for this visit:    1. Cough  -     XR CHEST PA LAT; Future  -     benzonatate (Tessalon Perles) 100 mg capsule; Take 1 Capsule by mouth three (3) times daily as needed for Cough. -     azithromycin (ZITHROMAX) 250 mg tablet; Take 2 tablets today, then take 1 tablet daily  -     CBC WITH AUTOMATED DIFF; Future  -     METABOLIC PANEL, COMPREHENSIVE; Future    2. COVID-19    Asked patient to quarantine himself, use a mask at home. Monitor oxygen saturations, temperature. For worsening of symptoms may need to go to the ER. I spent at least 20 minutes on this visit with this established patient. Subjective:       Prior to Admission medications    Medication Sig Start Date End Date Taking? Authorizing Provider   allopurinoL (ZYLOPRIM) 100 mg tablet TAKE 1 TABLET BY MOUTH DAILY 10/28/21   Armin Beth MD   indomethacin (INDOCIN) 50 mg capsule TAKE 1 CAPSULE BY MOUTH TWICE DAILY AS NEEDED 6/29/21   Armin Beth MD   triamcinolone acetonide (KENALOG) 0.1 % ointment Apply  to affected area two (2) times a day. use thin layer 6/22/21   Armin Beth MD   lisinopril-hydroCHLOROthiazide (PRINZIDE, ZESTORETIC) 20-12.5 mg per tablet TAKE 1 TABLET BY MOUTH DAILY 3/17/21   Armin Beth MD   albuterol (PROVENTIL HFA, VENTOLIN HFA, PROAIR HFA) 90 mcg/actuation inhaler Take 1 Puff by inhalation every four (4) hours as needed for Wheezing. 7/22/20   Asia Sherman NP   aspirin (ASPIRIN) 325 mg tablet Take 81 mg by mouth. Provider, Historical   ALPRAZolam (XANAX) 0.5 mg tablet Take 1 Tab by mouth nightly as needed for Anxiety for up to 15 doses. Max Daily Amount: 0.5 mg. 11/28/17   Armin Beth MD     HIstory    Pt has symptoms of cough for 2 weeks , runny nose ,.  Pt tested neg for COVID couple of weeks ago and tested pos today.   Pt had stem cell  Transplant. in the past.  Pt has dry cough. Pt works  in [employer]. ROS    Objective:     Patient-Reported Vitals 12/16/2021   Patient-Reported Weight 228   Patient-Reported Height 6'2''   Patient-Reported Pulse 87   Patient-Reported Temperature 98.6   Patient-Reported Systolic  591   Patient-Reported Diastolic 80        [INSTRUCTIONS:  \"[x]\" Indicates a positive item  \"[]\" Indicates a negative item  -- DELETE ALL ITEMS NOT EXAMINED]    Constitutional: [x] Appears well-developed and well-nourished [x] No apparent distress      [] Abnormal -     Mental status: [x] Alert and awake  [x] Oriented to person/place/time [x] Able to follow commands    [] Abnormal -     Eyes:   EOM    [x]  Normal    [] Abnormal -   Sclera  [x]  Normal    [] Abnormal -          Discharge [x]  None visible   [] Abnormal -     HENT: [x] Normocephalic, atraumatic  [] Abnormal -   [x] Mouth/Throat: Mucous membranes are moist    External Ears [x] Normal  [] Abnormal -    Neck: [x] No visualized mass [] Abnormal -     Pulmonary/Chest: [x] Respiratory effort normal   [x] No visualized signs of difficulty breathing or respiratory distress        [] Abnormal -      Musculoskeletal:   [x] Normal gait with no signs of ataxia         [x] Normal range of motion of neck        [] Abnormal -     Neurological:        [x] No Facial Asymmetry (Cranial nerve 7 motor function) (limited exam due to video visit)          [x] No gaze palsy        [] Abnormal -          Skin:        [x] No significant exanthematous lesions or discoloration noted on facial skin         [] Abnormal -            Psychiatric:       [x] Normal Affect [] Abnormal -        [x] No Hallucinations    Other pertinent observable physical exam findings:-        We discussed the expected course, resolution and complications of the diagnosis(es) in detail. Medication risks, benefits, costs, interactions, and alternatives were discussed as indicated.   I advised him to contact the office if his condition worsens, changes or fails to improve as anticipated. He expressed understanding with the diagnosis(es) and plan. Derik Boucher, was evaluated through a synchronous (real-time) audio-video encounter. The patient (or guardian if applicable) is aware that this is a billable service. Verbal consent to proceed has been obtained within the past 12 months. The visit was conducted pursuant to the emergency declaration under the [address] [place] [address] and the [employer] [facility] General Act. Patient identification was verified, and a caregiver was present when appropriate. The patient was located in a state where the provider was credentialed to provide care.       Xu Quezada MD

## 2022-01-27 ENCOUNTER — PATIENT MESSAGE (OUTPATIENT)
Dept: PRIMARY CARE CLINIC | Age: 62
End: 2022-01-27

## 2022-01-27 DIAGNOSIS — M1A.9XX0 CHRONIC GOUT INVOLVING TOE WITHOUT TOPHUS, UNSPECIFIED CAUSE, UNSPECIFIED LATERALITY: ICD-10-CM

## 2022-01-27 DIAGNOSIS — E79.0 ELEVATED URIC ACID IN BLOOD: ICD-10-CM

## 2022-01-27 RX ORDER — ALLOPURINOL 100 MG/1
100 TABLET ORAL DAILY
Qty: 90 TABLET | Refills: 0 | Status: SHIPPED | OUTPATIENT
Start: 2022-01-27 | End: 2022-04-30

## 2022-01-27 NOTE — TELEPHONE ENCOUNTER
From: Donal Thomas  To: Zeenat Lopez MD  Sent: 1/27/2022 10:16 AM EST  Subject: Mary Navas     Please allow [facility] to refill my prescription for 100mgs of Alopurinol for gout. Gout sucks. And hurts. Bad.   Thanks,  Roberth Mack

## 2022-01-27 NOTE — TELEPHONE ENCOUNTER
Requested Prescriptions     Pending Prescriptions Disp Refills    allopurinoL (ZYLOPRIM) 100 mg tablet 90 Tablet 0     Sig: Take 1 Tablet by mouth daily.         Last Visit 12/16/21  Last Refill 10/28/21

## 2022-03-07 ENCOUNTER — PATIENT MESSAGE (OUTPATIENT)
Dept: PRIMARY CARE CLINIC | Age: 62
End: 2022-03-07

## 2022-03-07 DIAGNOSIS — I10 ESSENTIAL HYPERTENSION: ICD-10-CM

## 2022-03-07 RX ORDER — LISINOPRIL AND HYDROCHLOROTHIAZIDE 12.5; 2 MG/1; MG/1
TABLET ORAL
Qty: 90 TABLET | Refills: 1 | Status: CANCELLED | OUTPATIENT
Start: 2022-03-07

## 2022-03-08 RX ORDER — LISINOPRIL AND HYDROCHLOROTHIAZIDE 12.5; 2 MG/1; MG/1
TABLET ORAL
Qty: 90 TABLET | Refills: 0 | Status: SHIPPED | OUTPATIENT
Start: 2022-03-08 | End: 2022-06-05

## 2022-03-08 NOTE — TELEPHONE ENCOUNTER
From: Mya Corona  To: Abel Sanchez MD  Sent: 3/7/2022 11:58 AM EST  Subject: Giselle Monge needs Lisinopril refill. .. Dr. Corazon Faye,  Please refill my Lisinopril prescription for 90 days.    Thank you,  Lukas Carrillo

## 2022-03-19 PROBLEM — Z94.81 STATUS POST BONE MARROW TRANSPLANT (HCC): Status: ACTIVE | Noted: 2019-06-25

## 2022-03-19 PROBLEM — J22 LOWER RESP. TRACT INFECTION: Status: ACTIVE | Noted: 2018-10-29

## 2022-04-30 DIAGNOSIS — E79.0 ELEVATED URIC ACID IN BLOOD: ICD-10-CM

## 2022-04-30 DIAGNOSIS — M1A.9XX0 CHRONIC GOUT INVOLVING TOE WITHOUT TOPHUS, UNSPECIFIED CAUSE, UNSPECIFIED LATERALITY: ICD-10-CM

## 2022-04-30 RX ORDER — ALLOPURINOL 100 MG/1
100 TABLET ORAL DAILY
Qty: 90 TABLET | Refills: 0 | Status: SHIPPED | OUTPATIENT
Start: 2022-04-30

## 2022-06-04 DIAGNOSIS — I10 ESSENTIAL HYPERTENSION: ICD-10-CM

## 2022-06-05 RX ORDER — LISINOPRIL AND HYDROCHLOROTHIAZIDE 12.5; 2 MG/1; MG/1
TABLET ORAL
Qty: 90 TABLET | Refills: 0 | Status: SHIPPED | OUTPATIENT
Start: 2022-06-05 | End: 2022-09-29

## 2022-06-09 ENCOUNTER — APPOINTMENT (OUTPATIENT)
Dept: CT IMAGING | Age: 62
End: 2022-06-09
Attending: EMERGENCY MEDICINE
Payer: COMMERCIAL

## 2022-06-09 ENCOUNTER — HOSPITAL ENCOUNTER (EMERGENCY)
Age: 62
Discharge: ANOTHER HOSPITAL | End: 2022-06-09
Attending: EMERGENCY MEDICINE | Admitting: EMERGENCY MEDICINE
Payer: COMMERCIAL

## 2022-06-09 VITALS
HEART RATE: 109 BPM | RESPIRATION RATE: 13 BRPM | TEMPERATURE: 98.1 F | SYSTOLIC BLOOD PRESSURE: 164 MMHG | OXYGEN SATURATION: 100 % | WEIGHT: 245.59 LBS | BODY MASS INDEX: 31.52 KG/M2 | HEIGHT: 74 IN | DIASTOLIC BLOOD PRESSURE: 103 MMHG

## 2022-06-09 DIAGNOSIS — I63.9 ACUTE ISCHEMIC STROKE (HCC): Primary | ICD-10-CM

## 2022-06-09 LAB
ALBUMIN SERPL-MCNC: 4 G/DL (ref 3.5–5)
ALBUMIN/GLOB SERPL: 1.2 {RATIO} (ref 1.1–2.2)
ALP SERPL-CCNC: 76 U/L (ref 45–117)
ALT SERPL-CCNC: 46 U/L (ref 12–78)
ANION GAP SERPL CALC-SCNC: 6 MMOL/L (ref 5–15)
AST SERPL-CCNC: 26 U/L (ref 15–37)
ATRIAL RATE: 110 BPM
BASOPHILS # BLD: 0.1 K/UL (ref 0–0.1)
BASOPHILS NFR BLD: 1 % (ref 0–1)
BILIRUB SERPL-MCNC: 0.5 MG/DL (ref 0.2–1)
BUN SERPL-MCNC: 18 MG/DL (ref 6–20)
BUN/CREAT SERPL: 13 (ref 12–20)
CALCIUM SERPL-MCNC: 9.3 MG/DL (ref 8.5–10.1)
CALCULATED P AXIS, ECG09: 51 DEGREES
CALCULATED R AXIS, ECG10: -39 DEGREES
CALCULATED T AXIS, ECG11: 119 DEGREES
CHLORIDE SERPL-SCNC: 104 MMOL/L (ref 97–108)
CO2 SERPL-SCNC: 29 MMOL/L (ref 21–32)
CREAT SERPL-MCNC: 1.36 MG/DL (ref 0.7–1.3)
DIAGNOSIS, 93000: NORMAL
DIFFERENTIAL METHOD BLD: NORMAL
EOSINOPHIL # BLD: 0.3 K/UL (ref 0–0.4)
EOSINOPHIL NFR BLD: 7 % (ref 0–7)
ERYTHROCYTE [DISTWIDTH] IN BLOOD BY AUTOMATED COUNT: 12.5 % (ref 11.5–14.5)
GLOBULIN SER CALC-MCNC: 3.4 G/DL (ref 2–4)
GLUCOSE BLD STRIP.AUTO-MCNC: 100 MG/DL (ref 65–117)
GLUCOSE SERPL-MCNC: 99 MG/DL (ref 65–100)
HCT VFR BLD AUTO: 40.6 % (ref 36.6–50.3)
HGB BLD-MCNC: 13.8 G/DL (ref 12.1–17)
IMM GRANULOCYTES # BLD AUTO: 0 K/UL (ref 0–0.04)
IMM GRANULOCYTES NFR BLD AUTO: 0 % (ref 0–0.5)
INR PPP: 1 (ref 0.9–1.1)
LYMPHOCYTES # BLD: 1.7 K/UL (ref 0.8–3.5)
LYMPHOCYTES NFR BLD: 36 % (ref 12–49)
MCH RBC QN AUTO: 33 PG (ref 26–34)
MCHC RBC AUTO-ENTMCNC: 34 G/DL (ref 30–36.5)
MCV RBC AUTO: 97.1 FL (ref 80–99)
MONOCYTES # BLD: 0.5 K/UL (ref 0–1)
MONOCYTES NFR BLD: 12 % (ref 5–13)
NEUTS SEG # BLD: 2 K/UL (ref 1.8–8)
NEUTS SEG NFR BLD: 44 % (ref 32–75)
NRBC # BLD: 0 K/UL (ref 0–0.01)
NRBC BLD-RTO: 0 PER 100 WBC
P-R INTERVAL, ECG05: 166 MS
PLATELET # BLD AUTO: 194 K/UL (ref 150–400)
PMV BLD AUTO: 10.8 FL (ref 8.9–12.9)
POTASSIUM SERPL-SCNC: 4.1 MMOL/L (ref 3.5–5.1)
PROT SERPL-MCNC: 7.4 G/DL (ref 6.4–8.2)
PROTHROMBIN TIME: 9.7 SEC (ref 9–11.1)
Q-T INTERVAL, ECG07: 380 MS
QRS DURATION, ECG06: 136 MS
QTC CALCULATION (BEZET), ECG08: 514 MS
RBC # BLD AUTO: 4.18 M/UL (ref 4.1–5.7)
SERVICE CMNT-IMP: NORMAL
SODIUM SERPL-SCNC: 139 MMOL/L (ref 136–145)
TROPONIN I BLD-MCNC: <0.04 NG/ML (ref 0–0.08)
VENTRICULAR RATE, ECG03: 110 BPM
WBC # BLD AUTO: 4.6 K/UL (ref 4.1–11.1)

## 2022-06-09 PROCEDURE — 74011250636 HC RX REV CODE- 250/636

## 2022-06-09 PROCEDURE — 96374 THER/PROPH/DIAG INJ IV PUSH: CPT

## 2022-06-09 PROCEDURE — 85025 COMPLETE CBC W/AUTO DIFF WBC: CPT

## 2022-06-09 PROCEDURE — 65610000006 HC RM INTENSIVE CARE

## 2022-06-09 PROCEDURE — 93005 ELECTROCARDIOGRAM TRACING: CPT

## 2022-06-09 PROCEDURE — 74011000258 HC RX REV CODE- 258: Performed by: EMERGENCY MEDICINE

## 2022-06-09 PROCEDURE — 84484 ASSAY OF TROPONIN QUANT: CPT

## 2022-06-09 PROCEDURE — 70496 CT ANGIOGRAPHY HEAD: CPT

## 2022-06-09 PROCEDURE — 70450 CT HEAD/BRAIN W/O DYE: CPT

## 2022-06-09 PROCEDURE — 36415 COLL VENOUS BLD VENIPUNCTURE: CPT

## 2022-06-09 PROCEDURE — 80053 COMPREHEN METABOLIC PANEL: CPT

## 2022-06-09 PROCEDURE — 85610 PROTHROMBIN TIME: CPT

## 2022-06-09 PROCEDURE — 82962 GLUCOSE BLOOD TEST: CPT

## 2022-06-09 PROCEDURE — 96375 TX/PRO/DX INJ NEW DRUG ADDON: CPT

## 2022-06-09 PROCEDURE — 74011250636 HC RX REV CODE- 250/636: Performed by: EMERGENCY MEDICINE

## 2022-06-09 PROCEDURE — 99285 EMERGENCY DEPT VISIT HI MDM: CPT

## 2022-06-09 PROCEDURE — 74011000636 HC RX REV CODE- 636: Performed by: EMERGENCY MEDICINE

## 2022-06-09 RX ORDER — SODIUM CHLORIDE 9 MG/ML
50 INJECTION, SOLUTION INTRAVENOUS ONCE
Status: DISCONTINUED | OUTPATIENT
Start: 2022-06-09 | End: 2022-06-09 | Stop reason: SDUPTHER

## 2022-06-09 RX ORDER — DIPHENHYDRAMINE HYDROCHLORIDE 50 MG/ML
50 INJECTION, SOLUTION INTRAMUSCULAR; INTRAVENOUS
Status: COMPLETED | OUTPATIENT
Start: 2022-06-09 | End: 2022-06-09

## 2022-06-09 RX ORDER — SODIUM CHLORIDE 9 MG/ML
50 INJECTION, SOLUTION INTRAVENOUS ONCE
Status: COMPLETED | OUTPATIENT
Start: 2022-06-09 | End: 2022-06-09

## 2022-06-09 RX ADMIN — DIPHENHYDRAMINE HYDROCHLORIDE 50 MG: 50 INJECTION, SOLUTION INTRAMUSCULAR; INTRAVENOUS at 09:54

## 2022-06-09 RX ADMIN — ALTEPLASE 81 MG: KIT at 10:59

## 2022-06-09 RX ADMIN — ALTEPLASE 9 MG: KIT at 10:58

## 2022-06-09 RX ADMIN — SODIUM CHLORIDE 50 ML: 9 INJECTION, SOLUTION INTRAVENOUS at 10:59

## 2022-06-09 RX ADMIN — IOPAMIDOL 100 ML: 755 INJECTION, SOLUTION INTRAVENOUS at 10:02

## 2022-06-09 RX ADMIN — METHYLPREDNISOLONE SODIUM SUCCINATE 125 MG: 125 INJECTION, POWDER, FOR SOLUTION INTRAMUSCULAR; INTRAVENOUS at 09:54

## 2022-06-09 RX ADMIN — SODIUM CHLORIDE 1000 ML: 9 INJECTION, SOLUTION INTRAVENOUS at 11:04

## 2022-06-09 NOTE — Clinical Note
Status[de-identified] INPATIENT [101]   Type of Bed: Intensive Care [6]   Inpatient Hospitalization Certified Necessary for the Following Reasons: 4.  Patient requires ICU level of care interventions (further clarification in H&P documentation)   Admitting Diagnosis: CVA (cerebral vascular accident) [facility]) [[id number]]   Admitting Physician: Chris Stark [[id number]]   Attending Physician: Chris Stark [[id number]]   Estimated Length of Stay: 7+ Midnights   Discharge Plan[de-identified] Home with Office Follow-up

## 2022-06-09 NOTE — ED NOTES
Pt STERLING with critical care transport with mon x 3 and defib pads in place en route to [facility] Neuro[facility] ICU. Pt is awake, alert, and appropriately conversing with family and staff. Skin is pink, warm, and dry, respirations are unlabored. As pt is transferred out of the dept via transport stretcher, pt has clear and appropriate speech. No complaints at this time.

## 2022-06-09 NOTE — ED NOTES
1113: Patient converted to SVT. MD at bedside. Pads placed on patient. 1115: Patient converted to Sinus rhythm. 117: EKG performed. Given to Dr. Shu Martinez     1120: Patient began to experience tingling on the right side of his face. Reports decrease vision from the right vision. 1126: Patient reports left back side of head is feeling like this morning. He describes it as pressure. 1132: Cardiac Rhythm is currently Sinus Tach. 1217: Critical Care Team on Scene to transport patient. Verbal report given to Critical Care Team.     TRANSFER - OUT REPORT:    Verbal report given to Page Terrazas RN(name) on Derik Cuff  being transferred to [facility] for routine progression of care       Report consisted of patients Situation, Background, Assessment and   Recommendations(SBAR). Information from the following report(s) SBAR, Kardex, ED Summary, Intake/Output, MAR, Recent Results, Med Rec Status, Cardiac Rhythm Normal Sinus Rhythm and Quality Measures was reviewed with the receiving nurse. Lines:   Venous Access Device Upper chest (subclavicular area, right (Active)       Peripheral IV 06/09/22 Distal;Right Antecubital (Active)       Peripheral IV 06/09/22 Distal;Left Antecubital (Active)       Peripheral IV 06/09/22 Left Hand (Active)        Opportunity for questions and clarification was provided.       Patient transported with:   Monitor  Registered Nurse

## 2022-06-09 NOTE — ED PROVIDER NOTES
70-year-old male with a history of GERD, gout, hypertension, lymphoma presents with a chief complaint of blurry vision. Patient states that approximate 30 minutes prior to arrival he had an episode of blurry vision and he was standing at the refrigerator when he felt a shock in his chest and through his body. Past Medical History:   Diagnosis Date    Cancer [facility])     NHL    Chemotherapy follow-up examination     NIKO 9-25-15= Exercise stress echo is normal. Walked 9 minutes, normal EF    GERD (gastroesophageal reflux disease)     Gout 10/24/2011    Hypertension     Lymphoma, small cleaved-cell, follicular (HCC)     recurrent,currently on remission    Sun-damaged skin        Past Surgical History:   Procedure Laterality Date    HX HEENT      lymph node resection-midline,neck    HX TRANSPLANT  10/2013    bone marrow transplant         Family History:   Problem Relation Age of Onset    Lung Disease Mother         COPD    Heart Disease Father         polymyalgia rheumatica    Hypertension Brother     Cancer Brother         testicular cancer       Social History     Socioeconomic History    Marital status:      Spouse name: Not on file    Number of children: Not on file    Years of education: Not on file    Highest education level: Not on file   Occupational History    Not on file   Tobacco Use    Smoking status: Former Smoker     Packs/day: 1.00     Years: 7.00     Pack years: 7.00     Quit date: 1999     Years since quittin.7    Smokeless tobacco: Never Used   Substance and Sexual Activity    Alcohol use: Yes     Alcohol/week: 5.8 standard drinks     Types: 7 Standard drinks or equivalent per week    Drug use: No    Sexual activity: Yes     Partners: Female   Other Topics Concern    Not on file   Social History Narrative    Works in [employer] at [employer].       Social Determinants of Health     Financial Resource Strain:     Difficulty of Paying Living Expenses: Not on file   Food Insecurity:     Worried About Running Out of Food in the Last Year: Not on file    Purvi of Food in the Last Year: Not on file   Transportation Needs:     Lack of Transportation (Medical): Not on file    Lack of Transportation (Non-Medical): Not on file   Physical Activity:     Days of Exercise per Week: Not on file    Minutes of Exercise per Session: Not on file   Stress:     Feeling of Stress : Not on file   Social Connections:     Frequency of Communication with Friends and Family: Not on file    Frequency of Social Gatherings with Friends and Family: Not on file    Attends [employer] Services: Not on file    Active Member of [address] [place] [address] [employer] or Organizations: Not on file    Attends Club or Organization Meetings: Not on file    Marital Status: Not on file   Intimate Partner Violence:     Fear of Current or Ex-Partner: Not on file    Emotionally Abused: Not on file    Physically Abused: Not on file    Sexually Abused: Not on file   Housing Stability:     Unable to Pay for Housing in the Last Year: Not on file    Number of [place] in the Last Year: Not on file    Unstable Housing in the Last Year: Not on file         ALLERGIES: Lactose and Iodinated contrast media    Review of Systems   Constitutional: Negative for fever. HENT: Negative for rhinorrhea. Eyes: Positive for visual disturbance. Respiratory: Negative for cough. Cardiovascular: Negative for chest pain. Gastrointestinal: Negative for abdominal pain. Genitourinary: Negative for dysuria. Musculoskeletal: Negative for back pain. Skin: Negative for wound. Neurological: Negative for headaches. Psychiatric/Behavioral: Negative for confusion. There were no vitals filed for this visit. Physical Exam  Vitals and nursing note reviewed. Constitutional:       General: He is not in acute distress. Appearance: Normal appearance. He is not ill-appearing, toxic-appearing or diaphoretic. HENT:      Head: Normocephalic. Eyes:      Extraocular Movements: Extraocular movements intact. Cardiovascular:      Rate and Rhythm: Normal rate. Pulses: Normal pulses. Pulmonary:      Effort: Pulmonary effort is normal. No respiratory distress. Abdominal:      General: There is no distension. Musculoskeletal:         General: Normal range of motion. Cervical back: Normal range of motion. Skin:     General: Skin is dry. Capillary Refill: Capillary refill takes less than 2 seconds. Neurological:      Mental Status: He is alert and oriented to person, place, and time. Comments: Right superior visual field defect. NIH 1. Psychiatric:         Mood and Affect: Mood normal.          MDM  Number of Diagnoses or Management Options  Acute ischemic stroke [facility])  Diagnosis management comments:     21-year-old male presents with blurry vision that started 30 minutes to an hour prior to arrival.  His presentation is concerning for stroke. A level 1 stroke was activated and the patient was taken to CT where he underwent CT of the head without contrast and CTA of the head and neck. I was speaking with teleneurology and there was some concern for dissection given the patient's presentation; therefore, CTA of the chest was also ordered at that time. CTA as reviewed by neurology revealed a possible P3 occlusion. I ran the case by Dr. Meghana Swenson with neuro interventional surgery. He did not feel that any intervention was indicated. Patient was consented for tPA by myself and the telemetry neurologist.  Patient was agreeable to tPA. There was a significant delay between tPA administration and the patient's imaging due to consent and patient hesitancy initially. While the patient was receiving tPA he went into SVT on the monitor. His blood pressure was acceptable. He states that his symptoms at that time were similar to the episode that he had at home in front of the refrigerator.   The patient and his wife requested transfer to [facility]. I was able to speak with neurosurgery at [facility], Dr. Kate Salmeron who accepted the patient as an admission to their ICU. The patient was transferred by critical care ambulance. EKG shows sinus tachycardia at a rate of 110, prolonged QTC, left axis deviation, left bundle branch block, no ischemic changes.     Total critical care time spent exclusive of procedures: 40 minutes         Amount and/or Complexity of Data Reviewed  Clinical lab tests: ordered and reviewed  Tests in the radiology section of CPT®: ordered and reviewed           Procedures

## 2022-06-09 NOTE — ED TRIAGE NOTES
Triage Note: Patient arrives to ER complaining of waking up and hearing a buzzing noise. Patient stated he began to experienced blurred vision, perception alteration, dizziness, and tinging in right arm. Patient reports presently he is experiencing perception differences and dizziness.  Code Stroke Level 1 called per MD.

## 2022-06-13 ENCOUNTER — TELEPHONE (OUTPATIENT)
Dept: PRIMARY CARE CLINIC | Age: 62
End: 2022-06-13

## 2022-09-28 ENCOUNTER — PATIENT MESSAGE (OUTPATIENT)
Dept: PRIMARY CARE CLINIC | Age: 62
End: 2022-09-28

## 2022-09-29 ENCOUNTER — OFFICE VISIT (OUTPATIENT)
Dept: PRIMARY CARE CLINIC | Age: 62
End: 2022-09-29
Payer: COMMERCIAL

## 2022-09-29 VITALS
WEIGHT: 209 LBS | SYSTOLIC BLOOD PRESSURE: 113 MMHG | BODY MASS INDEX: 26.82 KG/M2 | RESPIRATION RATE: 16 BRPM | HEIGHT: 74 IN | OXYGEN SATURATION: 99 % | DIASTOLIC BLOOD PRESSURE: 74 MMHG | HEART RATE: 84 BPM | TEMPERATURE: 97.9 F

## 2022-09-29 DIAGNOSIS — Z12.5 PROSTATE CANCER SCREENING: ICD-10-CM

## 2022-09-29 DIAGNOSIS — R47.81 SLURRED SPEECH: ICD-10-CM

## 2022-09-29 DIAGNOSIS — I10 ESSENTIAL HYPERTENSION: Primary | ICD-10-CM

## 2022-09-29 DIAGNOSIS — F32.A DEPRESSION, UNSPECIFIED DEPRESSION TYPE: ICD-10-CM

## 2022-09-29 DIAGNOSIS — R01.1 HEART MURMUR: ICD-10-CM

## 2022-09-29 DIAGNOSIS — D69.6 THROMBOCYTOPENIA (HCC): ICD-10-CM

## 2022-09-29 DIAGNOSIS — C85.90 LYMPHOMA, UNSPECIFIED BODY REGION, UNSPECIFIED LYMPHOMA TYPE (HCC): ICD-10-CM

## 2022-09-29 DIAGNOSIS — D64.9 ANEMIA, UNSPECIFIED TYPE: ICD-10-CM

## 2022-09-29 DIAGNOSIS — R53.1 RIGHT SIDED WEAKNESS: ICD-10-CM

## 2022-09-29 DIAGNOSIS — D72.819 LEUKOPENIA, UNSPECIFIED TYPE: ICD-10-CM

## 2022-09-29 DIAGNOSIS — I65.02 STENOSIS OF LEFT VERTEBRAL ARTERY: ICD-10-CM

## 2022-09-29 DIAGNOSIS — E53.8 B12 DEFICIENCY: ICD-10-CM

## 2022-09-29 DIAGNOSIS — Z23 ENCOUNTER FOR IMMUNIZATION: ICD-10-CM

## 2022-09-29 DIAGNOSIS — E79.0 ELEVATED URIC ACID IN BLOOD: ICD-10-CM

## 2022-09-29 DIAGNOSIS — M10.9 GOUT, UNSPECIFIED CAUSE, UNSPECIFIED CHRONICITY, UNSPECIFIED SITE: ICD-10-CM

## 2022-09-29 PROCEDURE — 99214 OFFICE O/P EST MOD 30 MIN: CPT | Performed by: INTERNAL MEDICINE

## 2022-09-29 RX ORDER — CLOPIDOGREL BISULFATE 75 MG/1
TABLET ORAL
COMMUNITY
Start: 2022-06-09

## 2022-09-29 RX ORDER — SERTRALINE HYDROCHLORIDE 25 MG/1
25 TABLET, FILM COATED ORAL DAILY
Qty: 90 TABLET | Refills: 1 | Status: SHIPPED | OUTPATIENT
Start: 2022-09-29

## 2022-09-29 RX ORDER — TAMSULOSIN HYDROCHLORIDE 0.4 MG/1
CAPSULE ORAL
COMMUNITY
Start: 2022-06-09

## 2022-09-29 RX ORDER — ATORVASTATIN CALCIUM 80 MG/1
TABLET, FILM COATED ORAL
COMMUNITY
Start: 2022-09-01

## 2022-09-29 RX ORDER — UREA 10 %
LOTION (ML) TOPICAL
COMMUNITY
Start: 2022-06-09

## 2022-09-29 RX ORDER — LANOLIN ALCOHOL/MO/W.PET/CERES
CREAM (GRAM) TOPICAL
COMMUNITY
Start: 2022-06-09

## 2022-09-29 RX ORDER — SERTRALINE HYDROCHLORIDE 25 MG/1
TABLET, FILM COATED ORAL
COMMUNITY
Start: 2022-06-09 | End: 2022-09-29 | Stop reason: SDUPTHER

## 2022-09-29 RX ORDER — CARVEDILOL 3.12 MG/1
TABLET ORAL
COMMUNITY
Start: 2022-09-27

## 2022-09-29 RX ORDER — GUAIFENESIN 100 MG/5ML
LIQUID (ML) ORAL
COMMUNITY
Start: 2022-06-09

## 2022-09-29 NOTE — TELEPHONE ENCOUNTER
From: Triston Webb  To: Kelly Gomez MD  Sent: 9/28/2022 10:31 AM EDT  Subject: 9/29 Visit    Good morning, I assume that you were notified that [address] positive on 9/24 and was hospitalized at [facility] on a precautionary basis for 3 nights. He had a high fever on 9/24, but has been asymptomatic otherwise. Just wanted to confirm whether we should attend the 9/29 visit in person or change that visit to a virtual visit. We really do not want to cancel since this is a follow-up to Bernardino's lengthy admissions following strokes on 6/9 and we want him to get reestablished with you, in particular to manage medications from here. Thank you.

## 2022-09-29 NOTE — PROGRESS NOTES
Chief Complaint   Patient presents with    Hospital Follow Up     [facility] stroke 6/9/2022        Visit Vitals  /74 (BP 1 Location: Right upper arm, BP Patient Position: Sitting)   Pulse 84   Temp 97.9 °F (36.6 °C) (Temporal)   Resp 16   Ht 6' 2\" (1.88 m)   Wt 209 lb (94.8 kg)   SpO2 99%   BMI 26.83 kg/m²        Health Maintenance Due   Topic    Depression Screen     Shingrix Vaccine Age 50> (1 of 2)    COVID-19 Vaccine (4 - Booster)    Flu Vaccine (1)        1. Have you been to the ER, urgent care clinic since your last visit? Hospitalized since your last visit? [facility] stroke 6/9/2022    2. Have you seen or consulted any other health care providers outside of the [address] [place] [address] since your last visit? Include any pap smears or colon screening.  No

## 2022-09-29 NOTE — PROGRESS NOTES
Dinorah Peña is a 63 y.o.  male and presents with     Chief Complaint   Patient presents with    [address] stroke 6/9/2022     Pt is here to establish care. Pt was admitted to [facility] in June for a stroke. Pt has rt sided weakness. Pt is left handed. Speech is slurred. Pt has h/o follicular lymphoma diagnosed in 2013 and had bone marrow trasnplasnt  CT head showed stenosis of the verterbal artery. Pt had stent placement on the left side by Dr Sheridan Owusuping is in wheelchair. Was in [facility] for 6 weeks and home PT. Sees Neurology at [facility]. Has PEG tube  Pt used to [employer] at [facility] who retired and then saw Oncologist at [facility] who also retired. Pt had some inocontince  isues during the hospital stay and was staretd on flomax. Has a heart murmur and was seeing cardiology  Had cardiac cath  in the hospital .     Past Medical History:   Diagnosis Date    Cancer [facility])     NHL    Chemotherapy follow-up examination     NIKO 9-25-15= Exercise stress echo is normal. Walked 9 minutes, normal EF    GERD (gastroesophageal reflux disease)     Gout 10/24/2011    Hypertension     Lymphoma, small cleaved-cell, follicular (HCC)     recurrent,currently on remission    Sun-damaged skin      Past Surgical History:   Procedure Laterality Date    HX HEENT      lymph node resection-midline,neck    HX TRANSPLANT  10/2013    bone marrow transplant     Current Outpatient Medications   Medication Sig    clopidogreL (PLAVIX) 75 mg tab Take 1 tablet GTUBE Daily - Do not request refills from this prescriber    cyanocobalamin (VITAMIN B12) 100 mcg tablet Take one tablet (100 mcg) GTUBE Daily - Do not request refills from this prescriber    melatonin (Melatin) 3 mg tablet     tamsulosin (FLOMAX) 0.4 mg capsule Take one capsule (0.4 mg) GTUBE Daily - Do not request refills from this prescriber    aspirin 81 mg chewable tablet     sertraline (ZOLOFT) 25 mg tablet Take 1 Tablet by mouth daily.     allopurinoL (ZYLOPRIM) 100 mg tablet TAKE 1 TABLET BY MOUTH DAILY    indomethacin (INDOCIN) 50 mg capsule TAKE 1 CAPSULE BY MOUTH TWICE DAILY AS NEEDED    albuterol (PROVENTIL HFA, VENTOLIN HFA, PROAIR HFA) 90 mcg/actuation inhaler Take 1 Puff by inhalation every four (4) hours as needed for Wheezing. atorvastatin (LIPITOR) 80 mg tablet     carvediloL (COREG) 3.125 mg tablet      No current facility-administered medications for this visit. Health Maintenance   Topic Date Due    Shingrix Vaccine Age 49> (1 of 2) Never done    COVID-19 Vaccine (4 - Booster) 01/29/2022    Flu Vaccine (1) 08/01/2022    Lipid Screen  06/09/2023    Colorectal Cancer Screening Combo  08/06/2023    Depression Monitoring  09/29/2023    Pneumococcal 0-64 years (4 - PPSV23 or PCV20) 03/24/2027    DTaP/Tdap/Td series (6 - Td or Tdap) 02/24/2030    Hepatitis C Screening  Completed     Immunization History   Administered Date(s) Administered    COVID-19, MODERNA BLUE border, Primary or Immunocompromised, (age 18y+), IM, 100 mcg/0.5mL 03/10/2021, 04/12/2021    COVID-19, PFIZER PURPLE top, DILUTE for use, (age 15 y+), IM, 30mcg/0.3mL 11/06/2021    H1N1 Influenza Virus Vaccine 01/19/2010    Influenza Vaccine 10/26/2014, 12/28/2015, 10/10/2017    Pneumococcal Vaccine (Unspecified Type) 01/01/2007    Td, Adsorbed PF 11/25/2016    Tdap 02/24/2020     No LMP for male patient. Allergies and Intolerances: Allergies   Allergen Reactions    Lactose Nausea and Vomiting    Iodinated Contrast Media Hives       Family History:   Family History   Problem Relation Age of Onset    Lung Disease Mother         COPD    Heart Disease Father         polymyalgia rheumatica    Hypertension Brother     Cancer Brother         testicular cancer       Social History:   He  reports that he quit smoking about 23 years ago. He has a 7.00 pack-year smoking history.  He has never used smokeless tobacco.  He  reports current alcohol use of about 5.8 standard drinks per week.            Review of Systems:   General: negative for - chills, fatigue, fever, weight change  Psych: negative for - anxiety, depression, irritability or mood swings  ENT: negative for - headaches, hearing change, nasal congestion, oral lesions, sneezing or sore throat  Heme/ Lymph: negative for - bleeding problems, bruising, pallor or swollen lymph nodes  Endo: negative for - hot flashes, polydipsia/polyuria or temperature intolerance  Resp: negative for - cough, shortness of breath or wheezing  CV: negative for - chest pain, edema or palpitations  GI: negative for - abdominal pain, change in bowel habits, constipation, diarrhea or nausea/vomiting  : negative for - dysuria, hematuria, incontinence, pelvic pain or vulvar/vaginal symptoms  MSK: negative for - joint pain, joint swelling or muscle pain  Neuro: negative for - confusion, headaches, seizures or weakness  Derm: negative for - dry skin, hair changes, rash or skin lesion changes          Physical:   Vitals:   Vitals:    09/29/22 1010   BP: 113/74   Pulse: 84   Resp: 16   Temp: 97.9 °F (36.6 °C)   TempSrc: Temporal   SpO2: 99%   Weight: 209 lb (94.8 kg)   Height: 6' 2\" (1.88 m)           Exam:   HEENT- atraumatic,normocephalic, awake, oriented, well nourished,expressive aphasia  Neck - supple,no enlarged lymph nodes, no JVD, no thyromegaly  Chest- CTA, no rhonchi, no crackles  Heart- rrr, pos heart  murmur   Abdomen- soft,BS+,NT, no hepatosplenomegaly,PEg tube in place  Ext - no c/c/edema   Neuro-  in wheelchair , accompanied by wife, slurred speech, right-sided weakness  Skin - warm,dry, no obvious rashes.           Review of Data:   LABS:   Lab Results   Component Value Date/Time    WBC 4.6 06/09/2022 09:30 AM    HGB 13.8 06/09/2022 09:30 AM    HCT 40.6 06/09/2022 09:30 AM    PLATELET 087 82/05/7316 09:30 AM     Lab Results   Component Value Date/Time    Sodium 139 06/09/2022 09:30 AM    Potassium 4.1 06/09/2022 09:30 AM    Chloride 104 06/09/2022 09:30 AM    CO2 29 06/09/2022 09:30 AM    Glucose 99 06/09/2022 09:30 AM    BUN 18 06/09/2022 09:30 AM    Creatinine 1.36 (H) 06/09/2022 09:30 AM     Lab Results   Component Value Date/Time    Cholesterol, total 194 06/25/2019 10:43 AM    HDL Cholesterol 55 06/25/2019 10:43 AM    LDL, calculated 124 (H) 06/25/2019 10:43 AM    Triglyceride 74 06/25/2019 10:43 AM     No components found for: GPT        Impression / Plan:        ICD-10-CM ICD-9-CM    1. Essential hypertension  I10 401.9       2. Elevated uric acid in blood  E79.0 790.6       3. Lymphoma, unspecified body region, unspecified lymphoma type ([facility]  C85.90 202.80       4. Gout, unspecified cause, unspecified chronicity, unspecified site  M10.9 274.9       5. Stenosis of left vertebral artery  I65.02 433.20       6. Right sided weakness  R53.1 728.87       7. Slurred speech  R47.81 784.59       8. Anemia, unspecified type  D64.9 285.9 IRON PROFILE      FERRITIN      PSA, DIAGNOSTIC (PROSTATE SPECIFIC AG)      VITAMIN B12 & FOLATE      IRON PROFILE      FERRITIN      PSA, DIAGNOSTIC (PROSTATE SPECIFIC AG)      VITAMIN B12 & FOLATE      IRON PROFILE      FERRITIN      PSA, DIAGNOSTIC (PROSTATE SPECIFIC AG)      VITAMIN B12 & FOLATE      9. Thrombocytopenia (HCC)  D69.6 287.5 IRON PROFILE      FERRITIN      PSA, DIAGNOSTIC (PROSTATE SPECIFIC AG)      VITAMIN B12 & FOLATE      IRON PROFILE      FERRITIN      PSA, DIAGNOSTIC (PROSTATE SPECIFIC AG)      IRON PROFILE      FERRITIN      PSA, DIAGNOSTIC (PROSTATE SPECIFIC AG)      10. Leukopenia, unspecified type  D72.819 288.50 IRON PROFILE      FERRITIN      PSA, DIAGNOSTIC (PROSTATE SPECIFIC AG)      VITAMIN B12 & FOLATE      11. Depression, unspecified depression type  F32. A 311 sertraline (ZOLOFT) 25 mg tablet      12. Encounter for immunization  Z23 V03.89 INFLUENZA, FLUARIX, FLULAVAL, FLUZONE (AGE 6 MO+), AFLURIA(AGE 3Y+) IM, PF, 0.5 ML      13. Heart murmur  R01.1 785.2       14.  B12 deficiency  E53.8 266.2 VITAMIN B12 & FOLATE      VITAMIN B12 & FOLATE      15. Prostate cancer screening  Z12.5 V76.44 PSA, DIAGNOSTIC (PROSTATE SPECIFIC AG)      PSA, DIAGNOSTIC (PROSTATE SPECIFIC AG)      Handicap parking form filled  Follow-up with neurology    Explained to patient risk benefits of the medications. Advised patient to stop meds if having any side effects. Pt verbalized understanding of the instructions. I have discussed the diagnosis with the patient and the intended plan as seen in the above orders. The patient has received an after-visit summary and questions were answered concerning future plans. I have discussed medication side effects and warnings with the patient as well. I have reviewed the plan of care with the patient, accepted their input and they are in agreement with the treatment goals. Reviewed plan of care. Patient has provided input and agrees with goals. Follow-up and Dispositions    Return in about 4 months (around 1/29/2023).          Chris Wright MD

## 2022-09-29 NOTE — TELEPHONE ENCOUNTER
Called and spoke with wife and patient, he is not currently having any s/s for Covid still- patient will still be seen in office, it has been over 5 days

## 2022-09-30 LAB
FERRITIN SERPL-MCNC: 529 NG/ML (ref 30–400)
FOLATE SERPL-MCNC: >20 NG/ML
IRON SATN MFR SERPL: 13 % (ref 15–55)
IRON SERPL-MCNC: 34 UG/DL (ref 38–169)
PSA SERPL-MCNC: 4.8 NG/ML (ref 0–4)
TIBC SERPL-MCNC: 264 UG/DL (ref 250–450)
UIBC SERPL-MCNC: 230 UG/DL (ref 111–343)
VIT B12 SERPL-MCNC: 936 PG/ML (ref 232–1245)

## 2022-10-02 DIAGNOSIS — R97.20 ELEVATED PSA: Primary | ICD-10-CM

## 2022-10-06 ENCOUNTER — TELEPHONE (OUTPATIENT)
Dept: PRIMARY CARE CLINIC | Age: 62
End: 2022-10-06

## 2022-10-06 NOTE — TELEPHONE ENCOUNTER
Turner from [facility] health called and wanted to speak with the nurse to give a update.     [phone] and ask for Turner

## 2022-11-03 ENCOUNTER — TELEPHONE (OUTPATIENT)
Dept: PRIMARY CARE CLINIC | Age: 62
End: 2022-11-03

## 2022-11-03 NOTE — TELEPHONE ENCOUNTER
Called and spoke with Lona- [facility].  They are needing a statement and Dr Duran Fore signature for the order of a FEES swallowing test. They will need this to be faxed over   Fax [phone]

## 2022-11-03 NOTE — TELEPHONE ENCOUNTER
Lona the Speech Pathologist with [facility] needs a verbal order from Dr Юлия Aceves. Patient is being seen today. SEES procedure.       Fax [phone]

## 2022-11-07 NOTE — TELEPHONE ENCOUNTER
Faxed over script for patient, called Lona and left a VM for her letting her know I faxed the script

## 2022-12-15 ENCOUNTER — PATIENT MESSAGE (OUTPATIENT)
Dept: PRIMARY CARE CLINIC | Age: 62
End: 2022-12-15

## 2022-12-16 RX ORDER — ATORVASTATIN CALCIUM 80 MG/1
80 TABLET, FILM COATED ORAL DAILY
Qty: 90 TABLET | Refills: 1 | Status: SHIPPED | OUTPATIENT
Start: 2022-12-16

## 2022-12-16 NOTE — TELEPHONE ENCOUNTER
From: Sagar Queen  To: Asmita Patino MD  Sent: 12/15/2022 8:04 PM EST  Subject: Atorvastatin refill    Bernardino needs a refill on his atorvastatin, but we cannot specifically request it via [address] Our preferred pharmacy is now the [facility] at the [employer]. Please send the refill request to this pharmacy or let us know of questions. Thank you.

## 2023-01-18 ENCOUNTER — OFFICE VISIT (OUTPATIENT)
Dept: PRIMARY CARE CLINIC | Age: 63
End: 2023-01-18
Payer: COMMERCIAL

## 2023-01-18 VITALS
SYSTOLIC BLOOD PRESSURE: 133 MMHG | HEART RATE: 71 BPM | OXYGEN SATURATION: 99 % | WEIGHT: 207 LBS | HEIGHT: 74 IN | BODY MASS INDEX: 26.56 KG/M2 | DIASTOLIC BLOOD PRESSURE: 82 MMHG | RESPIRATION RATE: 18 BRPM

## 2023-01-18 DIAGNOSIS — M1A.9XX0 CHRONIC GOUT INVOLVING TOE WITHOUT TOPHUS, UNSPECIFIED CAUSE, UNSPECIFIED LATERALITY: ICD-10-CM

## 2023-01-18 DIAGNOSIS — I35.0 AORTIC VALVE STENOSIS, ETIOLOGY OF CARDIAC VALVE DISEASE UNSPECIFIED: ICD-10-CM

## 2023-01-18 DIAGNOSIS — Z23 ENCOUNTER FOR IMMUNIZATION: ICD-10-CM

## 2023-01-18 DIAGNOSIS — Z23 NEED FOR SHINGLES VACCINE: Primary | ICD-10-CM

## 2023-01-18 DIAGNOSIS — I77.819 AORTIC DILATATION (HCC): ICD-10-CM

## 2023-01-18 DIAGNOSIS — R97.20 ELEVATED PSA: ICD-10-CM

## 2023-01-18 DIAGNOSIS — E79.0 ELEVATED URIC ACID IN BLOOD: ICD-10-CM

## 2023-01-18 DIAGNOSIS — I51.7 LVH (LEFT VENTRICULAR HYPERTROPHY): ICD-10-CM

## 2023-01-18 DIAGNOSIS — I10 ESSENTIAL HYPERTENSION: ICD-10-CM

## 2023-01-18 PROCEDURE — 99214 OFFICE O/P EST MOD 30 MIN: CPT | Performed by: INTERNAL MEDICINE

## 2023-01-18 PROCEDURE — 90686 IIV4 VACC NO PRSV 0.5 ML IM: CPT | Performed by: INTERNAL MEDICINE

## 2023-01-18 PROCEDURE — 3075F SYST BP GE 130 - 139MM HG: CPT | Performed by: INTERNAL MEDICINE

## 2023-01-18 PROCEDURE — 3079F DIAST BP 80-89 MM HG: CPT | Performed by: INTERNAL MEDICINE

## 2023-01-18 PROCEDURE — 90471 IMMUNIZATION ADMIN: CPT | Performed by: INTERNAL MEDICINE

## 2023-01-18 RX ORDER — ALLOPURINOL 100 MG/1
100 TABLET ORAL DAILY
Qty: 90 TABLET | Refills: 1 | Status: SHIPPED | OUTPATIENT
Start: 2023-01-18

## 2023-01-18 RX ORDER — ZOSTER VACCINE RECOMBINANT, ADJUVANTED 50 MCG/0.5
0.5 KIT INTRAMUSCULAR ONCE
Qty: 0.5 ML | Refills: 0 | Status: SHIPPED | OUTPATIENT
Start: 2023-01-18 | End: 2023-01-18

## 2023-01-18 NOTE — PROGRESS NOTES
Chief Complaint   Patient presents with    Other     Discuss getting off the tube feeding         Visit Vitals  /82 (BP 1 Location: Right upper arm, BP Patient Position: Sitting)   Pulse 71   Resp 18   Ht 6' 2\" (1.88 m)   Wt 207 lb (93.9 kg)   SpO2 99%   BMI 26.58 kg/m²        Health Maintenance Due   Topic    Shingles Vaccine (1 of 2)    Flu Vaccine (1)        1. \"Have you been to the ER, urgent care clinic since your last visit? Hospitalized since your last visit? \" No    2. \"Have you seen or consulted any other health care providers outside of the [address] [place] [address] since your last visit? \" No     3. For patients aged 39-70: Has the patient had a colonoscopy / FIT/ Cologuard? Yes - no Care Gap present      If the patient is female:    4. For patients aged 41-77: Has the patient had a mammogram within the past 2 years? NA - based on age or sex      11. For patients aged 21-65: Has the patient had a pap smear?  NA - based on age or sex

## 2023-01-18 NOTE — PROGRESS NOTES
Berenice Randle is a 63 y.o.  male and presents with     Chief Complaint   Patient presents with    Other     Discuss getting off the tube feeding      Pt is here for follow up. Pt has h/o stroke. Patient is in a wheelchair . he is accompanied by his wife   Patient has loss of peripheral vision. Got physical therapy at [facility]. As per wife patient has benefited significantly by getting physical therapy and occupational therapy and strengthening arms. HAd swallow study. Sees Speech therapsit. Is able to eat most things but not chips. Wife is trying to wean him off PEG tube  Pt had swallow test at [facility]  Pt is not able to do Liquids or very solids. Wt is stable    Needs refill on allopurinol    Has speech problems. Past Medical History:   Diagnosis Date    Cancer ([facility]     NHL    Chemotherapy follow-up examination     NIKO 9-25-15= Exercise stress echo is normal. Walked 9 minutes, normal EF    GERD (gastroesophageal reflux disease)     Gout 10/24/2011    Hypertension     Lymphoma, small cleaved-cell, follicular (HCC)     recurrent,currently on remission    Sun-damaged skin      Past Surgical History:   Procedure Laterality Date    HX HEENT      lymph node resection-midline,neck    HX TRANSPLANT  10/2013    bone marrow transplant     Current Outpatient Medications   Medication Sig    allopurinoL (ZYLOPRIM) 100 mg tablet Take 1 Tablet by mouth daily. varicella-zoster recombinant, PF, (Shingrix, PF,) 50 mcg/0.5 mL susr injection 0.5 mL by IntraMUSCular route once for 1 dose. atorvastatin (LIPITOR) 80 mg tablet Take 1 Tablet by mouth daily.     carvediloL (COREG) 3.125 mg tablet     clopidogreL (PLAVIX) 75 mg tab Take 1 tablet GTUBE Daily - Do not request refills from this prescriber    cyanocobalamin (VITAMIN B12) 100 mcg tablet Take one tablet (100 mcg) GTUBE Daily - Do not request refills from this prescriber    melatonin 3 mg tablet     aspirin 81 mg chewable tablet     sertraline (ZOLOFT) 25 mg tablet Take 1 Tablet by mouth daily. albuterol (PROVENTIL HFA, VENTOLIN HFA, PROAIR HFA) 90 mcg/actuation inhaler Take 1 Puff by inhalation every four (4) hours as needed for Wheezing. No current facility-administered medications for this visit. Health Maintenance   Topic Date Due    Shingles Vaccine (1 of 2) Never done    Flu Vaccine (1) 08/01/2022    COVID-19 Vaccine (4 - Booster) 01/19/2024 (Originally 1/1/2022)    Lipid Screen  06/09/2023    Colorectal Cancer Screening Combo  08/06/2023    Depression Monitoring  01/18/2024    Pneumococcal 0-64 years (4 - PPSV23 if available, else PCV20) 03/24/2027    DTaP/Tdap/Td series (6 - Td or Tdap) 02/24/2030    Hepatitis C Screening  Completed     Immunization History   Administered Date(s) Administered    COVID-19, MODERNA BLUE border, Primary or Immunocompromised, (age 18y+), IM, 100 mcg/0.5mL 03/10/2021, 04/12/2021    COVID-19, PFIZER PURPLE top, DILUTE for use, (age 15 y+), IM, 30mcg/0.3mL 11/06/2021    H1N1 Influenza Virus Vaccine 01/19/2010    Influenza Vaccine 10/26/2014, 12/28/2015, 10/10/2017    Pneumococcal Vaccine (Unspecified Type) 01/01/2007    Td, Adsorbed PF 11/25/2016    Tdap 02/24/2020     No LMP for male patient. Allergies and Intolerances: Allergies   Allergen Reactions    Lactose Nausea and Vomiting    Iodinated Contrast Media Hives       Family History:   Family History   Problem Relation Age of Onset    Lung Disease Mother         COPD    Heart Disease Father         polymyalgia rheumatica    Hypertension Brother     Cancer Brother         testicular cancer       Social History:   He  reports that he quit smoking about 23 years ago. He has a 7.00 pack-year smoking history. He has never used smokeless tobacco.  He  reports current alcohol use of about 5.8 standard drinks per week.             Review of Systems:   General: negative for - chills, fatigue, fever, weight change  Psych: negative for - anxiety, depression, irritability or mood swings  ENT: negative for - headaches, hearing change, nasal congestion, oral lesions, sneezing or sore throat  Heme/ Lymph: negative for - bleeding problems, bruising, pallor or swollen lymph nodes  Endo: negative for - hot flashes, polydipsia/polyuria or temperature intolerance  Resp: negative for - cough, shortness of breath or wheezing  CV: negative for - chest pain, edema or palpitations  GI: negative for - abdominal pain, change in bowel habits, constipation, diarrhea or nausea/vomiting  : negative for - dysuria, hematuria, incontinence, pelvic pain or vulvar/vaginal symptoms  MSK: negative for - joint pain, joint swelling or muscle pain  Neuro: negative for - confusion, headaches, seizures or weakness  Derm: negative for - dry skin, hair changes, rash or skin lesion changes          Physical:   Vitals:   Vitals:    01/18/23 1502   BP: 133/82   Pulse: 71   Resp: 18   SpO2: 99%   Weight: 207 lb (93.9 kg)   Height: 6' 2\" (1.88 m)           Exam: , loss of peripheral vision    HEENT- atraumatic,normocephalic, awake, oriented, well nourished  Neck - supple,no enlarged lymph nodes, no JVD, no thyromegaly  Chest- CTA, no rhonchi, no crackles  Heart- rrr, / gallop/rub, murmur heard in the aortic area, systolic in nature, soft S2  Abdomen- soft,BS+,NT, no hepatosplenomegaly, PEG tube in place  Ext - no c/c/edema   Neuro-in wheelchair ,poor hand nose co ordination, has gait instability, speech deficits    Skin - warm,dry, no obvious rashes.           Review of Data:   LABS:   Lab Results   Component Value Date/Time    WBC 4.6 06/09/2022 09:30 AM    HGB 13.8 06/09/2022 09:30 AM    HCT 40.6 06/09/2022 09:30 AM    PLATELET 892 67/21/0293 09:30 AM     Lab Results   Component Value Date/Time    Sodium 139 06/09/2022 09:30 AM    Potassium 4.1 06/09/2022 09:30 AM    Chloride 104 06/09/2022 09:30 AM    CO2 29 06/09/2022 09:30 AM    Glucose 99 06/09/2022 09:30 AM    BUN 18 06/09/2022 09:30 AM    Creatinine 1.36 (H) 06/09/2022 09:30 AM     Lab Results   Component Value Date/Time    Cholesterol, total 194 06/25/2019 10:43 AM    HDL Cholesterol 55 06/25/2019 10:43 AM    LDL, calculated 124 (H) 06/25/2019 10:43 AM    Triglyceride 74 06/25/2019 10:43 AM     No components found for: GPT        Impression / Plan:        ICD-10-CM ICD-9-CM    1. Need for shingles vaccine  Z23 V04.89 varicella-zoster recombinant, PF, (Shingrix, PF,) 50 mcg/0.5 mL susr injection      2. Elevated uric acid in blood  E79.0 790.6 allopurinoL (ZYLOPRIM) 100 mg tablet      3. Chronic gout involving toe without tophus, unspecified cause, unspecified laterality  M1A. 9XX0 274.02 allopurinoL (ZYLOPRIM) 100 mg tablet      4. Encounter for immunization  Z23 V03.89 INFLUENZA, FLUARIX, FLULAVAL, FLUZONE (AGE 6 MO+), AFLURIA(AGE 3Y+) IM, PF, 0.5 ML      5. Elevated PSA  R97.20 790.93 REFERRAL TO UROLOGY      6. Essential hypertension  I10 401.9       7. LVH (left ventricular hypertrophy)  I51.7 429.3       8. Aortic valve stenosis, etiology of cardiac valve disease unspecified  I35.0 424.1       9. Aortic dilatation [facility])  I77.819 447.70         Asked patient's wife to discuss aortic stenosis with cardiology. Will probably need a repeat echocardiogram  It is unclear as to what caused the stroke. However he had bilateral involvement of the brain raising concern for embolic stroke. Asked patient and his wife to discuss with neurology  Patient currently has PEG tube in place. Apparently he did well on the swallow test.  Wife is trying to wean him off PEG tube feedings and trying to advance oral feeds  If he tolerates it well, the PEG tube may come off.   Asked patient and his wife to discuss with neurology about PEG tube removal.  It is possible that the patient may need a repeat swallow study in 3 to 6 months  Spent over 30 minutes with the patient and his wife discussing various health concerns      Explained to patient risk benefits of the medications. Advised patient to stop meds if having any side effects. Pt verbalized understanding of the instructions. I have discussed the diagnosis with the patient and the intended plan as seen in the above orders. The patient has received an after-visit summary and questions were answered concerning future plans. I have discussed medication side effects and warnings with the patient as well. I have reviewed the plan of care with the patient, accepted their input and they are in agreement with the treatment goals. Reviewed plan of care. Patient has provided input and agrees with goals.         Randee Garner MD

## 2023-03-28 ENCOUNTER — TELEPHONE (OUTPATIENT)
Dept: PRIMARY CARE CLINIC | Age: 63
End: 2023-03-28

## 2023-03-28 DIAGNOSIS — T17.308A CHOKING, INITIAL ENCOUNTER: ICD-10-CM

## 2023-03-28 DIAGNOSIS — R53.1 RIGHT SIDED WEAKNESS: ICD-10-CM

## 2023-03-28 DIAGNOSIS — R47.81 SLURRED SPEECH: Primary | ICD-10-CM

## 2023-03-28 NOTE — TELEPHONE ENCOUNTER
Triny called from a specialty office. Needs an order for a modified barium swallow study.     Phone: [phone]  Fax: [phone]

## 2023-05-23 DIAGNOSIS — F32.A DEPRESSION, UNSPECIFIED: ICD-10-CM

## 2023-05-23 RX ORDER — SERTRALINE HYDROCHLORIDE 25 MG/1
TABLET, FILM COATED ORAL
Qty: 90 TABLET | Refills: 0 | Status: SHIPPED | OUTPATIENT
Start: 2023-05-23

## 2023-05-23 RX ORDER — ATORVASTATIN CALCIUM 80 MG/1
TABLET, FILM COATED ORAL
Qty: 90 TABLET | Refills: 0 | Status: SHIPPED | OUTPATIENT
Start: 2023-05-23

## 2024-10-01 NOTE — PROGRESS NOTES
Written by Tarik Lucas, as dictated by Dr. Kasey Russo MD.    Aleyda Murguia is a 56 y.o. male. HPI  The patient comes in today c/o ear pain, cough, and congestion. Pain is in both ears & he is having sinus headaches as well. No fever but feeling tired. He has been taking Sudafed (but has not taken any today), but has not used any nasal sprays. He has had ear infections in the past, but not more than once per year. 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 No current facility-administered medications on file prior to visit.         Allergies   Allergen Reactions    Lactose Nausea and Vomiting    Iodinated Contrast- Oral And Iv Dye Hives       Past Medical History:   Diagnosis Date    Cancer ([facility]     NHL    Chemotherapy follow-up examination     NIKO 9-25-15= Exercise stress echo is normal. Walked 9 minutes, normal EF    GERD (gastroesophageal reflux disease)     Gout 10/24/2011    Hypertension     Lymphoma, small cleaved-cell, follicular (HCC)     recurrent,currently on remission       Past Surgical History:   Procedure Laterality Date    HX HEENT      lymph node resection-midline,neck    HX TRANSPLANT  10/2013    bone marrow transplant       Family History   Problem Relation Age of Onset    Lung Disease Mother      COPD    Heart Disease Father      polymyalgia rheumatica    Hypertension Brother     Cancer Brother      testicular cancer       Social History     Social History    Marital status:      Spouse name: N/A    Number of children: N/A    Years of education: N/A     Occupational History    Not on file. Social History Main Topics    Smoking status: Former Smoker     Packs/day: 1.00     Years: 7.00     Quit date: 9/4/1999    Smokeless tobacco: Never Used    Alcohol use 3.5 oz/week     7 Standard drinks or equivalent per week    Drug use: No    Sexual activity: Yes     Partners: Female     Other Topics Concern    Not on file     Social History Narrative    Works in [employer]. [facility] Outpatient Visit on 09/13/2017   Component Date Value Ref Range Status    WBC 09/13/2017 4.3  4.1 - 11.1 K/uL Final    RBC 09/13/2017 4.06* 4.10 - 5.70 M/uL Final    HGB 09/13/2017 13.2  12.1 - 17.0 g/dL Final    HCT 09/13/2017 39.0  36.6 - 50.3 % Final    MCV 09/13/2017 96.1  80.0 - 99.0 FL Final    MCH 09/13/2017 32.5  26.0 - 34.0 PG Final    MCHC 09/13/2017 33.8  30.0 - 36.5 g/dL Final    RDW 09/13/2017 12.6  11.5 - 14.5 % Final    PLATELET 79/48/8065 172  150 - 400 K/uL Final    NEUTROPHILS 09/13/2017 53  32 - 75 % Final    LYMPHOCYTES 09/13/2017 27  12 - 49 % Final    MONOCYTES 09/13/2017 9  5 - 13 % Final    EOSINOPHILS 09/13/2017 10* 0 - 7 % Final    BASOPHILS 09/13/2017 1  0 - 1 % Final    ABS. NEUTROPHILS 09/13/2017 2.3  1.8 - 8.0 K/UL Final    ABS. LYMPHOCYTES 09/13/2017 1.1  0.8 - 3.5 K/UL Final    ABS. MONOCYTES 09/13/2017 0.4  0.0 - 1.0 K/UL Final    ABS. EOSINOPHILS 09/13/2017 0.4  0.0 - 0.4 K/UL Final    ABS.  BASOPHILS 09/13/2017 0.0  0.0 - 0.1 K/UL Final   Hospital Outpatient Visit on 08/30/2017   Component Date Value Ref Range Status    WBC 08/30/2017 4.6  4.1 - 11.1 K/uL Final    RBC 08/30/2017 4.48  4.10 - 5.70 M/uL Final    HGB 08/30/2017 14.9  12.1 - 17.0 g/dL Final    HCT 08/30/2017 43.4  36.6 - 50.3 % Final    MCV 08/30/2017 96.9  80.0 - 99.0 FL Final    MCH 08/30/2017 33.3  26.0 - 34.0 PG Final    MCHC 08/30/2017 34.3  30.0 - 36.5 g/dL Final  RDW 08/30/2017 12.4  11.5 - 14.5 % Final    PLATELET 28/97/3840 783  150 - 400 K/uL Final    NEUTROPHILS 08/30/2017 51  32 - 75 % Final    LYMPHOCYTES 08/30/2017 27  12 - 49 % Final    MONOCYTES 08/30/2017 11  5 - 13 % Final    EOSINOPHILS 08/30/2017 10* 0 - 7 % Final    BASOPHILS 08/30/2017 1  0 - 1 % Final    ABS. NEUTROPHILS 08/30/2017 2.3  1.8 - 8.0 K/UL Final    ABS. LYMPHOCYTES 08/30/2017 1.2  0.8 - 3.5 K/UL Final    ABS. MONOCYTES 08/30/2017 0.5  0.0 - 1.0 K/UL Final    ABS. EOSINOPHILS 08/30/2017 0.5* 0.0 - 0.4 K/UL Final    ABS. BASOPHILS 08/30/2017 0.1  0.0 - 0.1 K/UL Final    INR 08/30/2017 1.0  0.9 - 1.1   Final    Prothrombin time 08/30/2017 10.0  9.0 - 11.1 sec Final    aPTT 08/30/2017 31.8  22.1 - 32.5 sec Final    aPTT, therapeutic range 08/30/2017      58.0 - 77.0 SECS Final    Fibrinogen 08/30/2017 316  200 - 475 mg/dL Final       Review of Systems   Constitutional: Negative for malaise/fatigue. HENT: Positive for congestion and ear pain. Negative for sinus pain. Respiratory: Positive for cough. Negative for shortness of breath. Musculoskeletal: Negative for joint pain and myalgias. Neurological: Negative for weakness. Psychiatric/Behavioral: Negative for depression, memory loss and substance abuse. Visit Vitals    /82 (BP 1 Location: Left arm, BP Patient Position: Sitting)    Pulse 90    Temp 97.3 °F (36.3 °C) (Oral)    Resp 17    Ht 6' 2\" (1.88 m)    Wt 233 lb 6.4 oz (105.9 kg)    SpO2 98%    BMI 29.97 kg/m2       Physical Exam   Constitutional: He is oriented to person, place, and time. He appears well-developed and well-nourished. No distress. HENT:   BL erythematous ear canal, R ear cerumen buildup   Eyes: Conjunctivae and EOM are normal. Right eye exhibits no discharge. Left eye exhibits no discharge. Neck: Normal range of motion. Neck supple. Cardiovascular: Normal rate and regular rhythm.     Pulmonary/Chest: Effort normal and breath sounds normal. He has no wheezes. Abdominal: Soft. Bowel sounds are normal. There is no tenderness. Lymphadenopathy:     He has no cervical adenopathy. Neurological: He is alert and oriented to person, place, and time. Skin: He is not diaphoretic. Psychiatric: He has a normal mood and affect. His behavior is normal.   Nursing note and vitals reviewed. ASSESSMENT and PLAN    ICD-10-CM ICD-9-CM    1. Acute ear infection, bilateral H66.93 382.9 amoxicillin-clavulanate (AUGMENTIN) 875-125 mg per tablet sent to pharmacy      ciprofloxacin-dexamethasone (CIPRODEX) 0.3-0.1 % otic suspension sent to pharmacy   2. Acute non-recurrent frontal sinusitis J01.10 461.1 mometasone (NASONEX) 50 mcg/actuation nasal spray sent to pharmacy    Amoxicillin given, since he is having sinus sxs in addition to an ear infection. He should take a probiotic while he is taking this. I have also prescribed Nasonex. In the future, if he experiences ear infection sxs he can use Ciprodex ear drops. This plan was reviewed with the patient and patient agrees. All questions were answered. This scribe documentation was reviewed by me and accurately reflects the examination and decisions made by me. This note will not be viewable in [address] Instructions: This plan will send the code FBSD to the PM system.  DO NOT or CHANGE the price. Price (Do Not Change): 0.00 Detail Level: Simple